# Patient Record
Sex: FEMALE | Race: WHITE | NOT HISPANIC OR LATINO | Employment: UNEMPLOYED | URBAN - METROPOLITAN AREA
[De-identification: names, ages, dates, MRNs, and addresses within clinical notes are randomized per-mention and may not be internally consistent; named-entity substitution may affect disease eponyms.]

---

## 2021-02-20 ENCOUNTER — HOSPITAL ENCOUNTER (OUTPATIENT)
Facility: HOSPITAL | Age: 61
Setting detail: OBSERVATION
Discharge: HOME | End: 2021-02-21
Attending: EMERGENCY MEDICINE | Admitting: INTERNAL MEDICINE

## 2021-02-20 ENCOUNTER — APPOINTMENT (OUTPATIENT)
Dept: CT IMAGING | Facility: HOSPITAL | Age: 61
End: 2021-02-20

## 2021-02-20 DIAGNOSIS — R11.0 NAUSEA: ICD-10-CM

## 2021-02-20 DIAGNOSIS — R10.9 INTRACTABLE ABDOMINAL PAIN: ICD-10-CM

## 2021-02-20 DIAGNOSIS — K85.90 ACUTE ON CHRONIC PANCREATITIS (HCC): Primary | ICD-10-CM

## 2021-02-20 DIAGNOSIS — K86.1 ACUTE ON CHRONIC PANCREATITIS (HCC): Primary | ICD-10-CM

## 2021-02-20 LAB
ALBUMIN SERPL-MCNC: 4 G/DL (ref 3.5–5.2)
ALBUMIN/GLOB SERPL: 1.3 G/DL
ALP SERPL-CCNC: 52 U/L (ref 39–117)
ALT SERPL W P-5'-P-CCNC: 13 U/L (ref 1–33)
AMYLASE SERPL-CCNC: 68 U/L (ref 28–100)
ANION GAP SERPL CALCULATED.3IONS-SCNC: 8.8 MMOL/L (ref 5–15)
AST SERPL-CCNC: 22 U/L (ref 1–32)
BASOPHILS # BLD AUTO: 0.08 10*3/MM3 (ref 0–0.2)
BASOPHILS NFR BLD AUTO: 1.3 % (ref 0–1.5)
BILIRUB SERPL-MCNC: 0.5 MG/DL (ref 0–1.2)
BILIRUB UR QL STRIP: NEGATIVE
BUN SERPL-MCNC: 10 MG/DL (ref 6–20)
BUN/CREAT SERPL: 12.2 (ref 7–25)
CALCIUM SPEC-SCNC: 9 MG/DL (ref 8.6–10.5)
CHLORIDE SERPL-SCNC: 93 MMOL/L (ref 98–107)
CLARITY UR: CLEAR
CO2 SERPL-SCNC: 31.2 MMOL/L (ref 22–29)
COLOR UR: YELLOW
CREAT SERPL-MCNC: 0.82 MG/DL (ref 0.57–1)
DEPRECATED RDW RBC AUTO: 40.8 FL (ref 37–54)
EOSINOPHIL # BLD AUTO: 0.08 10*3/MM3 (ref 0–0.4)
EOSINOPHIL NFR BLD AUTO: 1.3 % (ref 0.3–6.2)
ERYTHROCYTE [DISTWIDTH] IN BLOOD BY AUTOMATED COUNT: 11.8 % (ref 12.3–15.4)
GFR SERPL CREATININE-BSD FRML MDRD: 72 ML/MIN/1.73
GLOBULIN UR ELPH-MCNC: 3 GM/DL
GLUCOSE SERPL-MCNC: 89 MG/DL (ref 65–99)
GLUCOSE UR STRIP-MCNC: NEGATIVE MG/DL
HCT VFR BLD AUTO: 40.7 % (ref 34–46.6)
HGB BLD-MCNC: 13.5 G/DL (ref 12–15.9)
HGB UR QL STRIP.AUTO: NEGATIVE
IMM GRANULOCYTES # BLD AUTO: 0.01 10*3/MM3 (ref 0–0.05)
IMM GRANULOCYTES NFR BLD AUTO: 0.2 % (ref 0–0.5)
KETONES UR QL STRIP: NEGATIVE
LEUKOCYTE ESTERASE UR QL STRIP.AUTO: NEGATIVE
LIPASE SERPL-CCNC: 17 U/L (ref 13–60)
LYMPHOCYTES # BLD AUTO: 2.64 10*3/MM3 (ref 0.7–3.1)
LYMPHOCYTES NFR BLD AUTO: 41.8 % (ref 19.6–45.3)
MAGNESIUM SERPL-MCNC: 1.9 MG/DL (ref 1.6–2.6)
MCH RBC QN AUTO: 31 PG (ref 26.6–33)
MCHC RBC AUTO-ENTMCNC: 33.2 G/DL (ref 31.5–35.7)
MCV RBC AUTO: 93.6 FL (ref 79–97)
MONOCYTES # BLD AUTO: 0.65 10*3/MM3 (ref 0.1–0.9)
MONOCYTES NFR BLD AUTO: 10.3 % (ref 5–12)
NEUTROPHILS NFR BLD AUTO: 2.85 10*3/MM3 (ref 1.7–7)
NEUTROPHILS NFR BLD AUTO: 45.1 % (ref 42.7–76)
NITRITE UR QL STRIP: NEGATIVE
NRBC BLD AUTO-RTO: 0 /100 WBC (ref 0–0.2)
PH UR STRIP.AUTO: 7 [PH] (ref 5–8)
PLATELET # BLD AUTO: 248 10*3/MM3 (ref 140–450)
PMV BLD AUTO: 9.7 FL (ref 6–12)
POTASSIUM SERPL-SCNC: 3.6 MMOL/L (ref 3.5–5.2)
PROT SERPL-MCNC: 7 G/DL (ref 6–8.5)
PROT UR QL STRIP: NEGATIVE
RBC # BLD AUTO: 4.35 10*6/MM3 (ref 3.77–5.28)
SARS-COV-2 ORF1AB RESP QL NAA+PROBE: NOT DETECTED
SODIUM SERPL-SCNC: 133 MMOL/L (ref 136–145)
SP GR UR STRIP: <=1.005 (ref 1–1.03)
UROBILINOGEN UR QL STRIP: NORMAL
WBC # BLD AUTO: 6.31 10*3/MM3 (ref 3.4–10.8)

## 2021-02-20 PROCEDURE — 25810000003 SODIUM CHLORIDE 0.9 % WITH KCL 20 MEQ 20-0.9 MEQ/L-% SOLUTION: Performed by: INTERNAL MEDICINE

## 2021-02-20 PROCEDURE — 25010000002 HYDROMORPHONE 1 MG/ML SOLUTION: Performed by: EMERGENCY MEDICINE

## 2021-02-20 PROCEDURE — 85025 COMPLETE CBC W/AUTO DIFF WBC: CPT | Performed by: EMERGENCY MEDICINE

## 2021-02-20 PROCEDURE — 25010000002 METOCLOPRAMIDE PER 10 MG: Performed by: EMERGENCY MEDICINE

## 2021-02-20 PROCEDURE — C9803 HOPD COVID-19 SPEC COLLECT: HCPCS

## 2021-02-20 PROCEDURE — G0378 HOSPITAL OBSERVATION PER HR: HCPCS

## 2021-02-20 PROCEDURE — 83690 ASSAY OF LIPASE: CPT | Performed by: EMERGENCY MEDICINE

## 2021-02-20 PROCEDURE — 25010000002 HYDROMORPHONE PER 4 MG: Performed by: INTERNAL MEDICINE

## 2021-02-20 PROCEDURE — 83735 ASSAY OF MAGNESIUM: CPT | Performed by: EMERGENCY MEDICINE

## 2021-02-20 PROCEDURE — 96376 TX/PRO/DX INJ SAME DRUG ADON: CPT

## 2021-02-20 PROCEDURE — 96375 TX/PRO/DX INJ NEW DRUG ADDON: CPT

## 2021-02-20 PROCEDURE — 81003 URINALYSIS AUTO W/O SCOPE: CPT | Performed by: EMERGENCY MEDICINE

## 2021-02-20 PROCEDURE — 96374 THER/PROPH/DIAG INJ IV PUSH: CPT

## 2021-02-20 PROCEDURE — 99284 EMERGENCY DEPT VISIT MOD MDM: CPT

## 2021-02-20 PROCEDURE — 25010000002 ENOXAPARIN PER 10 MG: Performed by: INTERNAL MEDICINE

## 2021-02-20 PROCEDURE — 25010000002 IOPAMIDOL 61 % SOLUTION: Performed by: INTERNAL MEDICINE

## 2021-02-20 PROCEDURE — 96372 THER/PROPH/DIAG INJ SC/IM: CPT

## 2021-02-20 PROCEDURE — 80053 COMPREHEN METABOLIC PANEL: CPT | Performed by: EMERGENCY MEDICINE

## 2021-02-20 PROCEDURE — 82150 ASSAY OF AMYLASE: CPT | Performed by: EMERGENCY MEDICINE

## 2021-02-20 PROCEDURE — U0004 COV-19 TEST NON-CDC HGH THRU: HCPCS | Performed by: EMERGENCY MEDICINE

## 2021-02-20 PROCEDURE — 74177 CT ABD & PELVIS W/CONTRAST: CPT

## 2021-02-20 RX ORDER — LEVOTHYROXINE SODIUM 0.1 MG/1
100 TABLET ORAL
Status: DISCONTINUED | OUTPATIENT
Start: 2021-02-21 | End: 2021-02-21 | Stop reason: HOSPADM

## 2021-02-20 RX ORDER — ACETAMINOPHEN 160 MG/5ML
650 SOLUTION ORAL EVERY 4 HOURS PRN
Status: DISCONTINUED | OUTPATIENT
Start: 2021-02-20 | End: 2021-02-21 | Stop reason: HOSPADM

## 2021-02-20 RX ORDER — TOPIRAMATE 100 MG/1
100 TABLET, FILM COATED ORAL NIGHTLY
COMMUNITY

## 2021-02-20 RX ORDER — PROMETHAZINE HYDROCHLORIDE 25 MG/1
12.5-25 TABLET ORAL EVERY 6 HOURS PRN
COMMUNITY

## 2021-02-20 RX ORDER — ACETAMINOPHEN 325 MG/1
650 TABLET ORAL EVERY 4 HOURS PRN
Status: DISCONTINUED | OUTPATIENT
Start: 2021-02-20 | End: 2021-02-21 | Stop reason: HOSPADM

## 2021-02-20 RX ORDER — NALOXONE HCL 0.4 MG/ML
0.4 VIAL (ML) INJECTION
Status: DISCONTINUED | OUTPATIENT
Start: 2021-02-20 | End: 2021-02-21 | Stop reason: HOSPADM

## 2021-02-20 RX ORDER — SODIUM CHLORIDE 0.9 % (FLUSH) 0.9 %
10 SYRINGE (ML) INJECTION AS NEEDED
Status: DISCONTINUED | OUTPATIENT
Start: 2021-02-20 | End: 2021-02-21 | Stop reason: HOSPADM

## 2021-02-20 RX ORDER — OXYCODONE AND ACETAMINOPHEN 10; 325 MG/1; MG/1
1 TABLET ORAL EVERY 8 HOURS PRN
Status: DISCONTINUED | OUTPATIENT
Start: 2021-02-20 | End: 2021-02-21

## 2021-02-20 RX ORDER — ESTRADIOL 2 MG/1
2 TABLET ORAL DAILY
COMMUNITY

## 2021-02-20 RX ORDER — LAMOTRIGINE 50 MG/1
50 TABLET, EXTENDED RELEASE ORAL DAILY
COMMUNITY

## 2021-02-20 RX ORDER — PROMETHAZINE HYDROCHLORIDE 12.5 MG/1
12.5 SUPPOSITORY RECTAL EVERY 6 HOURS PRN
COMMUNITY

## 2021-02-20 RX ORDER — SUCRALFATE 1 G/1
1 TABLET ORAL EVERY 4 HOURS PRN
COMMUNITY

## 2021-02-20 RX ORDER — ACETAMINOPHEN 650 MG/1
650 SUPPOSITORY RECTAL EVERY 4 HOURS PRN
Status: DISCONTINUED | OUTPATIENT
Start: 2021-02-20 | End: 2021-02-21 | Stop reason: HOSPADM

## 2021-02-20 RX ORDER — ONDANSETRON 2 MG/ML
4 INJECTION INTRAMUSCULAR; INTRAVENOUS EVERY 6 HOURS PRN
Status: DISCONTINUED | OUTPATIENT
Start: 2021-02-20 | End: 2021-02-21 | Stop reason: HOSPADM

## 2021-02-20 RX ORDER — SODIUM CHLORIDE 0.9 % (FLUSH) 0.9 %
10 SYRINGE (ML) INJECTION EVERY 12 HOURS SCHEDULED
Status: DISCONTINUED | OUTPATIENT
Start: 2021-02-20 | End: 2021-02-21 | Stop reason: HOSPADM

## 2021-02-20 RX ORDER — OXYCODONE HYDROCHLORIDE 5 MG/1
5 CAPSULE ORAL 4 TIMES DAILY PRN
COMMUNITY

## 2021-02-20 RX ORDER — HYDROMORPHONE HYDROCHLORIDE 1 MG/ML
0.5 INJECTION, SOLUTION INTRAMUSCULAR; INTRAVENOUS; SUBCUTANEOUS
Status: DISCONTINUED | OUTPATIENT
Start: 2021-02-20 | End: 2021-02-21

## 2021-02-20 RX ORDER — SODIUM CHLORIDE AND POTASSIUM CHLORIDE 150; 900 MG/100ML; MG/100ML
100 INJECTION, SOLUTION INTRAVENOUS CONTINUOUS
Status: DISCONTINUED | OUTPATIENT
Start: 2021-02-20 | End: 2021-02-21 | Stop reason: HOSPADM

## 2021-02-20 RX ORDER — METOCLOPRAMIDE HYDROCHLORIDE 5 MG/ML
10 INJECTION INTRAMUSCULAR; INTRAVENOUS ONCE
Status: COMPLETED | OUTPATIENT
Start: 2021-02-20 | End: 2021-02-20

## 2021-02-20 RX ADMIN — SODIUM CHLORIDE, PRESERVATIVE FREE 10 ML: 5 INJECTION INTRAVENOUS at 21:14

## 2021-02-20 RX ADMIN — HYDROMORPHONE HYDROCHLORIDE 1 MG: 10 INJECTION, SOLUTION INTRAMUSCULAR; INTRAVENOUS; SUBCUTANEOUS at 16:41

## 2021-02-20 RX ADMIN — POTASSIUM CHLORIDE AND SODIUM CHLORIDE 100 ML/HR: 900; 150 INJECTION, SOLUTION INTRAVENOUS at 22:32

## 2021-02-20 RX ADMIN — METOCLOPRAMIDE HYDROCHLORIDE 10 MG: 5 INJECTION INTRAMUSCULAR; INTRAVENOUS at 16:40

## 2021-02-20 RX ADMIN — SODIUM CHLORIDE, POTASSIUM CHLORIDE, SODIUM LACTATE AND CALCIUM CHLORIDE 1000 ML: 600; 310; 30; 20 INJECTION, SOLUTION INTRAVENOUS at 16:41

## 2021-02-20 RX ADMIN — HYDROMORPHONE HYDROCHLORIDE 1 MG: 1 INJECTION, SOLUTION INTRAMUSCULAR; INTRAVENOUS; SUBCUTANEOUS at 18:06

## 2021-02-20 RX ADMIN — HYDROMORPHONE HYDROCHLORIDE 0.5 MG: 1 INJECTION, SOLUTION INTRAMUSCULAR; INTRAVENOUS; SUBCUTANEOUS at 21:39

## 2021-02-20 RX ADMIN — HYDROMORPHONE HYDROCHLORIDE 0.5 MG: 1 INJECTION, SOLUTION INTRAMUSCULAR; INTRAVENOUS; SUBCUTANEOUS at 23:43

## 2021-02-20 RX ADMIN — SODIUM CHLORIDE, POTASSIUM CHLORIDE, SODIUM LACTATE AND CALCIUM CHLORIDE 1000 ML: 600; 310; 30; 20 INJECTION, SOLUTION INTRAVENOUS at 18:05

## 2021-02-20 RX ADMIN — IOPAMIDOL 85 ML: 612 INJECTION, SOLUTION INTRAVENOUS at 19:48

## 2021-02-20 RX ADMIN — ENOXAPARIN SODIUM 40 MG: 100 INJECTION SUBCUTANEOUS at 21:13

## 2021-02-20 NOTE — H&P
Internal medicine history and physical  INTERNAL MEDICINE   [facility]       Patient Identification:  Name: Fartun Kahn  Age: 60 y.o.  Sex: female  :  1960  MRN: [id number]                   Primary Care Physician: Johnnie uMñiz MD                               Date of admission:2021    Chief Complaint: Nausea vomiting and abdominal pain since last night.    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  Patient follow-up with pain management as well as with Dr. lee for chronic pancreatitis issues.  Patient has some issues with her pain management and her medications recently got messed up consistent with: Primary care physician yesterday with no significant resolution of her pain medication issues.  Her last flare was in October of last year.  In that background last night she developed abdominal discomfort with associated nausea resulting in her attempt to call her primary care physician with no avail.  Because of persistent symptoms despite taking pain medication this morning without any relief and antinausea medication she decided to come to the emergency room for further evaluation.  Patient initially attempted to go to [facility] but because of this she was taken she decided to come here instead to our facility.  Work-up in the emergency room did not reveal acute metabolic abnormality and she has preserved renal function.  Lipase within normal limits.  Patient being admitted for pain control as well as management of her nausea.      Past Medical History:  Past Medical History:   Diagnosis Date   • Bipolar disorder (CMS/HCC)    • Constipation due to opioid therapy    • Hypothyroid    • Hypothyroid      Past Surgical  "History:  Past Surgical History:   Procedure Laterality Date   • HYSTERECTOMY     • PANCREATIC RESECTION      partial   • WHIPPLE PROCEDURE        Home Meds:  (Not in a hospital admission)    Current Meds:     Current Facility-Administered Medications:   •  lactated ringers bolus 1,000 mL, 1,000 mL, Intravenous, Once, Luis A Reed MD, Last Rate: 2,000 mL/hr at 02/20/21 1805, 1,000 mL at 02/20/21 1805    Current Outpatient Medications:   •  Cholecalciferol (VITAMIN D3) 5000 UNITS capsule capsule, Take 2,000 Units by mouth Daily., Disp: , Rfl:   •  levothyroxine (SYNTHROID, LEVOTHROID) 100 MCG tablet, Take 100 mcg by mouth Daily., Disp: , Rfl:   •  oxyCODONE-acetaminophen (PERCOCET)  MG per tablet, Take 1 tablet by mouth Every 8 (Eight) Hours As Needed for moderate pain (4-6)., Disp: , Rfl:   •  triamterene-hydrochlorothiazide (DYAZIDE) 37.5-25 MG per capsule, Take 1 capsule by mouth Every Morning., Disp: , Rfl:   Allergies:  Allergies   Allergen Reactions   • Sulfa Antibiotics Nausea Only   • Sumatriptan Nausea And Vomiting     Caused reaction to pancreas; hx of chronic pancreatitis     Social History:   Social History     Tobacco Use   • Smoking status: Not on file   Substance Use Topics   • Alcohol use: Not on file      Family History:  History reviewed. No pertinent family history.       Review of Systems  See history of present illness and past medical history.   Constitutional: Remarkable for no fever or chills  Cardiovascular: Remarkable for no chest pain, shortness of breath  GI: Remarkable for nausea abdominal pain but no vomiting or diarrhea  : Remarkable for no burning in urination frequency or urgency  Musculoskeletal: No specific joint aches and pain  Neurological: Remarkable for no loss of consciousness or continence.    Remainder of ROS is negative.      Vitals:   BP 96/67   Pulse 73   Temp 97.6 °F (36.4 °C) (Tympanic)   Resp 18   Ht 170.2 cm (67\")   Wt 56.7 kg (125 lb)   SpO2 99%   " BMI 19.58 kg/m²   I/O:     Intake/Output Summary (Last 24 hours) at 2/20/2021 1823  Last data filed at 2/20/2021 1805  Gross per 24 hour   Intake 2000 ml   Output --   Net 2000 ml     Exam:  Patient is examined using the personal protective equipment as per guidelines from infection control for this particular patient as enacted.  Hand washing was performed before and after patient interaction.  General: Awake, alert, no acute distress, nontoxic, nondiaphoretic  HEENT: Mucous membranes moist, atraumatic, EOMI  Neck: Full ROM  Pulm: Symmetric chest rise, nonlabored, lungs CTAB  Cardiovascular: Regular rate and rhythm, intact distal pulses  GI: Soft, mild to moderate generalized tenderness to palpation, nondistended, no rebound, no guarding, bowel sounds present  MSK: Full ROM, no deformity  Skin: Warm, dry  Neuro: Alert and oriented x 3, GCS 15, moving all extremities, no focal deficits  Psych: Calm, cooperative    Data Review:      I reviewed the patient's new clinical results.  Results from last 7 days   Lab Units 02/20/21  1639   WBC 10*3/mm3 6.31   HEMOGLOBIN g/dL 13.5   PLATELETS 10*3/mm3 248     Results from last 7 days   Lab Units 02/20/21  1639   SODIUM mmol/L 133*   POTASSIUM mmol/L 3.6   CHLORIDE mmol/L 93*   CO2 mmol/L 31.2*   BUN mg/dL 10   CREATININE mg/dL 0.82   CALCIUM mg/dL 9.0   GLUCOSE mg/dL 89     Microbiology Results (last 10 days)     ** No results found for the last 240 hours. **        No radiology results for the last 30 days.  No orders to display       Assessment:  Active Hospital Problems    Diagnosis POA   • **Acute on chronic pancreatitis (CMS/HCC) [K85.90, K86.1] Yes   • Bipolar disorder (CMS/HCC) [F31.9] Unknown   • Constipation due to opioid therapy [K59.03, T40.2X5A] Unknown   • Hypothyroid [E03.9] Unknown       Medical decision making:  Acute on chronic recurrent pancreatitis with abdominal pain nausea-plan is to admit the patient provided with IV fluids along with clear liquids  advance diet as tolerated and continue with pain medications and antiemetics.  History of recurrent pancreatitis status post Whipple's procedure continue supportive care and home regimen for pain.  Hypothyroidism-continue thyroid replacement therapy.    Dehydration-continue with IV fluids.  Hypertension patient is on hydrochlorothiazide and spironolactone showing low sodium level and contraction alkalosis plan is to hold her diuretic while she is getting IV fluids.  Darlene Mcdonough MD   2/20/2021  18:23 EST  Much of this encounter note is an electronic transcription/translation of spoken language to printed text. The electronic translation of spoken language may permit erroneous, or at times, nonsensical words or phrases to be inadvertently transcribed; Although I have reviewed the note for such errors, some may still exist

## 2021-02-20 NOTE — ED TRIAGE NOTES
Pt reports abd. Pain started last night. Pt reports nausea. Pt has hx of pancreatitis. Pt took 30 mg phenergan and 20mg oxycodone PTA with no relief. Patient masked in first look triage. I was wearing mask and goggles.

## 2021-02-20 NOTE — ED PROVIDER NOTES
EMERGENCY DEPARTMENT ENCOUNTER    Room Number:  P681/1  Date of encounter:  2/20/2021  PCP: Johnnie Muñiz MD  Historian: Patient      HPI:  Chief Complaint: Abdominal pain, nausea  A complete HPI/ROS/PMH/PSH/SH/FH are unobtainable due to: None    Context: Fartun Kahn is a 60 y.o. female who presents to the ED via private vehicle for evaluation for worsening acute on chronic abdominal pain that started last night.  Reports significant nausea with this.  Reports a history of chronic pancreatitis related to a congenital pancreatic abnormality, status post Whipple in 2012.  Patient states home oxycodone and Phenergan not helping with pain or nausea.  Has not had any significant vomiting.  Describes some shortness of breath due to pain but denies any fevers, chills, cough, dysuria.  States that she took oxycodone at around 10 AM this morning without relief and Phenergan at 10 AM and again shortly before arrival without any improvement in nausea. Patient reported to triage nurse that she initially went to [place], but felt ignored and decided to come here instead.  She typically follows up with Dr. Muñiz and Dr. Correia as well as Pain Management for her chronic pancreatitis issues.  She states that her last flareup was several months ago      MEDICAL RECORD REVIEW    Telephone encounter noted with primary care provider's office stating that she had called on February 19 for a pancreatitis flare, states that her pain management office had a mishap with refill for pain medications and could not get it filled.  Primary care provider's office unable to fill it over the phone.    PAST MEDICAL HISTORY  Active Ambulatory Problems     Diagnosis Date Noted   • No Active Ambulatory Problems     Resolved Ambulatory Problems     Diagnosis Date Noted   • No Resolved Ambulatory Problems     Past Medical History:   Diagnosis Date   • Asthma    • Bipolar disorder (CMS/HCC)    • Constipation due to opioid therapy    • Elevated  cholesterol    • History of transfusion    • Hypothyroid    • Hypothyroid          PAST SURGICAL HISTORY  Past Surgical History:   Procedure Laterality Date   • ABDOMINAL SURGERY     • APPENDECTOMY     • COLONOSCOPY     • ENDOSCOPY     • FRACTURE SURGERY     • HYSTERECTOMY     • PANCREATIC RESECTION      partial   • TONSILLECTOMY     • WHIPPLE PROCEDURE           FAMILY HISTORY  History reviewed. No pertinent family history.      SOCIAL HISTORY  Social History     Socioeconomic History   • Marital status:      Spouse name: Not on file   • Number of children: Not on file   • Years of education: Not on file   • Highest education level: Not on file   Tobacco Use   • Smoking status: Never Smoker   Substance and Sexual Activity   • Alcohol use: Never     Frequency: Never   • Drug use: Never   • Sexual activity: Not Currently         ALLERGIES  Sulfa antibiotics and Sumatriptan        REVIEW OF SYSTEMS  Review of Systems     All systems reviewed and negative except for those discussed in HPI.       PHYSICAL EXAM    I have reviewed the triage vital signs and nursing notes.    ED Triage Vitals   Temp Heart Rate Resp BP SpO2   02/20/21 1543 02/20/21 1543 02/20/21 1543 02/20/21 1600 02/20/21 1543   97.6 °F (36.4 °C) 77 18 96/67 96 %      Temp src Heart Rate Source Patient Position BP Location FiO2 (%)   02/20/21 1543 -- -- -- --   Tympanic           Physical Exam  General: Awake, alert, no acute distress, nontoxic, nondiaphoretic  HEENT: Mucous membranes moist, atraumatic, EOMI  Neck: Full ROM  Pulm: Symmetric chest rise, nonlabored, lungs CTAB  Cardiovascular: Regular rate and rhythm, intact distal pulses  GI: Soft, mild to moderate generalized tenderness to palpation, nondistended, no rebound, no guarding, bowel sounds present  MSK: Full ROM, no deformity  Skin: Warm, dry  Neuro: Alert and oriented x 3, GCS 15, moving all extremities, no focal deficits  Psych: Calm, cooperative      I wore an N95 mask and gloves used  during this encounter. Patient in surgical mask.      LAB RESULTS  Recent Results (from the past 24 hour(s))   Urinalysis With Microscopic If Indicated (No Culture) - Urine, Clean Catch    Collection Time: 02/20/21  4:36 PM    Specimen: Urine, Clean Catch   Result Value Ref Range    Color, UA Yellow Yellow, Straw    Appearance, UA Clear Clear    pH, UA 7.0 5.0 - 8.0    Specific Gravity, UA <=1.005 1.005 - 1.030    Glucose, UA Negative Negative    Ketones, UA Negative Negative    Bilirubin, UA Negative Negative    Blood, UA Negative Negative    Protein, UA Negative Negative    Leuk Esterase, UA Negative Negative    Nitrite, UA Negative Negative    Urobilinogen, UA 0.2 E.U./dL 0.2 - 1.0 E.U./dL   Comprehensive Metabolic Panel    Collection Time: 02/20/21  4:39 PM    Specimen: Blood   Result Value Ref Range    Glucose 89 65 - 99 mg/dL    BUN 10 6 - 20 mg/dL    Creatinine 0.82 0.57 - 1.00 mg/dL    Sodium 133 (L) 136 - 145 mmol/L    Potassium 3.6 3.5 - 5.2 mmol/L    Chloride 93 (L) 98 - 107 mmol/L    CO2 31.2 (H) 22.0 - 29.0 mmol/L    Calcium 9.0 8.6 - 10.5 mg/dL    Total Protein 7.0 6.0 - 8.5 g/dL    Albumin 4.00 3.50 - 5.20 g/dL    ALT (SGPT) 13 1 - 33 U/L    AST (SGOT) 22 1 - 32 U/L    Alkaline Phosphatase 52 39 - 117 U/L    Total Bilirubin 0.5 0.0 - 1.2 mg/dL    eGFR Non African Amer 72 >60 mL/min/1.73    Globulin 3.0 gm/dL    A/G Ratio 1.3 g/dL    BUN/Creatinine Ratio 12.2 7.0 - 25.0    Anion Gap 8.8 5.0 - 15.0 mmol/L   Lipase    Collection Time: 02/20/21  4:39 PM    Specimen: Blood   Result Value Ref Range    Lipase 17 13 - 60 U/L   Magnesium    Collection Time: 02/20/21  4:39 PM    Specimen: Blood   Result Value Ref Range    Magnesium 1.9 1.6 - 2.6 mg/dL   CBC Auto Differential    Collection Time: 02/20/21  4:39 PM    Specimen: Blood   Result Value Ref Range    WBC 6.31 3.40 - 10.80 10*3/mm3    RBC 4.35 3.77 - 5.28 10*6/mm3    Hemoglobin 13.5 12.0 - 15.9 g/dL    Hematocrit 40.7 34.0 - 46.6 %    MCV 93.6 79.0 -  97.0 fL    MCH 31.0 26.6 - 33.0 pg    MCHC 33.2 31.5 - 35.7 g/dL    RDW 11.8 (L) 12.3 - 15.4 %    RDW-SD 40.8 37.0 - 54.0 fl    MPV 9.7 6.0 - 12.0 fL    Platelets 248 140 - 450 10*3/mm3    Neutrophil % 45.1 42.7 - 76.0 %    Lymphocyte % 41.8 19.6 - 45.3 %    Monocyte % 10.3 5.0 - 12.0 %    Eosinophil % 1.3 0.3 - 6.2 %    Basophil % 1.3 0.0 - 1.5 %    Immature Grans % 0.2 0.0 - 0.5 %    Neutrophils, Absolute 2.85 1.70 - 7.00 10*3/mm3    Lymphocytes, Absolute 2.64 0.70 - 3.10 10*3/mm3    Monocytes, Absolute 0.65 0.10 - 0.90 10*3/mm3    Eosinophils, Absolute 0.08 0.00 - 0.40 10*3/mm3    Basophils, Absolute 0.08 0.00 - 0.20 10*3/mm3    Immature Grans, Absolute 0.01 0.00 - 0.05 10*3/mm3    nRBC 0.0 0.0 - 0.2 /100 WBC   Amylase    Collection Time: 02/20/21  4:39 PM    Specimen: Blood   Result Value Ref Range    Amylase 68 28 - 100 U/L       Ordered the above labs and independently reviewed the results.        RADIOLOGY  Ct Abdomen Pelvis With Contrast    Result Date: 2/20/2021  CT OF THE ABDOMEN AND PELVIS WITH CONTRAST  HISTORY: Abdominal pain and nausea. HISTORY of pancreatitis. Patient is also undergone Whipple procedure.  COMPARISON: None available.  TECHNIQUE: Axial CT imaging was obtained through the abdomen and pelvis. IV contrast was administered.  FINDINGS: Images through the lung bases straight some mild dependent atelectasis. No focal hepatic lesions are seen. There is no biliary dilatation. Gallbladder is absent. Spleen and adrenal glands are normal. There are changes of prior Whipple procedure. There is no evidence of obstruction. The pancreatic body and tail appear unremarkable. There is a retroaortic left renal vein. The kidneys enhance symmetrically. There is no hydronephrosis. There is no evidence of bowel obstruction. Patient does have mesenteric edema, uncertain clinical significance. Urinary bladder is markedly distended. Correlation with any symptoms of urinary retention is recommended. Uterus is  surgically absent. There is a small amount of free fluid which is noted within the pelvis. There is some increased stool burden noted throughout the colon, which may represent some constipation. The appendix cannot be visualized. No acute osseous abnormalities are seen.       1. Changes of prior Whipple procedure. No convincing evidence of pancreatitis on these images. 2. Patient does appear to have some mesenteric edema, uncertain clinical significance. 3. Distention of the urinary bladder may reflect urinary retention. 4. Increased stool burden, suggesting constipation.  Radiation dose reduction techniques were utilized, including automated exposure control and exposure modulation based on body size.  This report was finalized on 2/20/2021 8:17 PM by Dr. Amy Ruth M.D.        I ordered the above noted radiological studies. Reviewed by me.  See dictation for official radiology interpretation.      PROCEDURES    Procedures      MEDICATIONS GIVEN IN ER    Medications   levothyroxine (SYNTHROID, LEVOTHROID) tablet 100 mcg (has no administration in time range)   oxyCODONE-acetaminophen (PERCOCET)  MG per tablet 1 tablet ( Oral Return to Cabinet 2/20/21 2223)   sodium chloride 0.9 % flush 10 mL (10 mL Intravenous Given 2/20/21 2114)   sodium chloride 0.9 % flush 10 mL (has no administration in time range)   enoxaparin (LOVENOX) syringe 40 mg (40 mg Subcutaneous Given 2/20/21 2113)   sodium chloride 0.9 % with KCl 20 mEq/L infusion (100 mL/hr Intravenous New Bag 2/20/21 2232)   ondansetron (ZOFRAN) injection 4 mg (has no administration in time range)   acetaminophen (TYLENOL) tablet 650 mg (has no administration in time range)     Or   acetaminophen (TYLENOL) 160 MG/5ML solution 650 mg (has no administration in time range)     Or   acetaminophen (TYLENOL) suppository 650 mg (has no administration in time range)   HYDROmorphone (DILAUDID) injection 0.5 mg (0.5 mg Intravenous Given 2/20/21 2139)     And    naloxone (NARCAN) injection 0.4 mg (has no administration in time range)   lactated ringers bolus 1,000 mL (1,000 mL Intravenous New Bag 2/20/21 1641)   metoclopramide (REGLAN) injection 10 mg (10 mg Intravenous Given 2/20/21 1640)   HYDROmorphone (DILAUDID) injection 1 mg (1 mg Intravenous Given 2/20/21 1641)   HYDROmorphone (DILAUDID) injection 1 mg (1 mg Intravenous Given 2/20/21 1806)   lactated ringers bolus 1,000 mL (0 mL Intravenous Stopped 2/20/21 1851)   iopamidol (ISOVUE-300) 61 % injection 100 mL (85 mL Intravenous Given by Other 2/20/21 1948)         PROGRESS, DATA ANALYSIS, CONSULTS, AND MEDICAL DECISION MAKING    All labs have been independently reviewed by me.  All radiology studies have been reviewed by me and discussed with radiologist dictating the report.   EKG's independently viewed and interpreted by me.  Discussion below represents my analysis of pertinent findings related to patient's condition, differential diagnosis, treatment plan and final disposition.    Likely acute on chronic pancreatitis but will evaluate for any evidence of renal failure, electrolyte abnormalities, as well among others.    Patient arrived appearing quite uncomfortable from the pain.  Vital signs appear stable and when discussing discharge versus observation the patient became quite emotional and adamant that her pain was quite severe and had not been appropriately addressed, and that she did not feel comfortable going home in her current state of pain.  CT scan was added on which showed no significant evidence of acute pancreatitis but did have mesenteric edema which may be driving some of the pain as well as some constipation.  However, due to the patient's reports of significant severe pain still, will plan for hospitalization for further pain control and monitoring.    ED Course as of Feb 20 2234   Sat Feb 20, 2021 1712 WBC: 6.31 [DC]   1712 Hemoglobin: 13.5 [DC]   1723 Lipase: 17 [DC]   1723 Ketones, UA:  Negative [DC]   1757 Patient reevaluated, updated her on the reassuring lab findings, she reports being in severe pain still with only minimal relief with the IV pain medications.  Nausea has improved.  She does not feel comfortable being discharged, states that her pain is way too severe at this time, needs further pain control.    [DC]   1821 Discussed with Dr. Mcdonough [facility], discussed patient's clinical course and findings today, treatment modalities, and need for hospitalization.    [DC]      ED Course User Index  [DC] Luis A Reed MD       AS OF 22:34 EST VITALS:    BP - 106/67  HR - 57  TEMP - 96.7 °F (35.9 °C) (Oral)  02 SATS - 98%        DIAGNOSIS  Final diagnoses:   Acute on chronic pancreatitis (CMS/HCC)   Intractable abdominal pain   Nausea         DISPOSITION  HOSPITALIZATION    Discussed treatment plan and reason for hospitalization with pt/family and hospitalizing physician.  Pt/family voiced understanding of the plan for hospitalization for further testing/treatment as needed.                  Luis A Reed MD  02/20/21 5058

## 2021-02-21 VITALS
HEART RATE: 67 BPM | WEIGHT: 122.5 LBS | OXYGEN SATURATION: 99 % | HEIGHT: 67 IN | TEMPERATURE: 97.5 F | BODY MASS INDEX: 19.23 KG/M2 | RESPIRATION RATE: 18 BRPM | SYSTOLIC BLOOD PRESSURE: 96 MMHG | DIASTOLIC BLOOD PRESSURE: 62 MMHG

## 2021-02-21 PROBLEM — R10.812 LEFT UPPER QUADRANT ABDOMINAL TENDERNESS: Status: ACTIVE | Noted: 2021-02-20

## 2021-02-21 PROBLEM — I95.2 HYPOTENSION DUE TO DRUGS: Status: ACTIVE | Noted: 2021-02-21

## 2021-02-21 LAB
ALBUMIN SERPL-MCNC: 3 G/DL (ref 3.5–5.2)
ALBUMIN/GLOB SERPL: 1.3 G/DL
ALP SERPL-CCNC: 41 U/L (ref 39–117)
ALT SERPL W P-5'-P-CCNC: 10 U/L (ref 1–33)
ANION GAP SERPL CALCULATED.3IONS-SCNC: 8.7 MMOL/L (ref 5–15)
AST SERPL-CCNC: 17 U/L (ref 1–32)
BASOPHILS # BLD AUTO: 0.06 10*3/MM3 (ref 0–0.2)
BASOPHILS NFR BLD AUTO: 1.4 % (ref 0–1.5)
BILIRUB SERPL-MCNC: 0.4 MG/DL (ref 0–1.2)
BUN SERPL-MCNC: 9 MG/DL (ref 6–20)
BUN/CREAT SERPL: 14.3 (ref 7–25)
CALCIUM SPEC-SCNC: 8.1 MG/DL (ref 8.6–10.5)
CHLORIDE SERPL-SCNC: 104 MMOL/L (ref 98–107)
CO2 SERPL-SCNC: 25.3 MMOL/L (ref 22–29)
CREAT SERPL-MCNC: 0.63 MG/DL (ref 0.57–1)
DEPRECATED RDW RBC AUTO: 40.6 FL (ref 37–54)
EOSINOPHIL # BLD AUTO: 0.08 10*3/MM3 (ref 0–0.4)
EOSINOPHIL NFR BLD AUTO: 1.8 % (ref 0.3–6.2)
ERYTHROCYTE [DISTWIDTH] IN BLOOD BY AUTOMATED COUNT: 11.7 % (ref 12.3–15.4)
GFR SERPL CREATININE-BSD FRML MDRD: 97 ML/MIN/1.73
GLOBULIN UR ELPH-MCNC: 2.4 GM/DL
GLUCOSE SERPL-MCNC: 85 MG/DL (ref 65–99)
HCT VFR BLD AUTO: 34.3 % (ref 34–46.6)
HGB BLD-MCNC: 11.6 G/DL (ref 12–15.9)
IMM GRANULOCYTES # BLD AUTO: 0 10*3/MM3 (ref 0–0.05)
IMM GRANULOCYTES NFR BLD AUTO: 0 % (ref 0–0.5)
LYMPHOCYTES # BLD AUTO: 2.35 10*3/MM3 (ref 0.7–3.1)
LYMPHOCYTES NFR BLD AUTO: 54 % (ref 19.6–45.3)
MCH RBC QN AUTO: 32.3 PG (ref 26.6–33)
MCHC RBC AUTO-ENTMCNC: 33.8 G/DL (ref 31.5–35.7)
MCV RBC AUTO: 95.5 FL (ref 79–97)
MONOCYTES # BLD AUTO: 0.38 10*3/MM3 (ref 0.1–0.9)
MONOCYTES NFR BLD AUTO: 8.7 % (ref 5–12)
NEUTROPHILS NFR BLD AUTO: 1.48 10*3/MM3 (ref 1.7–7)
NEUTROPHILS NFR BLD AUTO: 34.1 % (ref 42.7–76)
NRBC BLD AUTO-RTO: 0 /100 WBC (ref 0–0.2)
PLATELET # BLD AUTO: 186 10*3/MM3 (ref 140–450)
PMV BLD AUTO: 10 FL (ref 6–12)
POTASSIUM SERPL-SCNC: 3.5 MMOL/L (ref 3.5–5.2)
PROT SERPL-MCNC: 5.4 G/DL (ref 6–8.5)
RBC # BLD AUTO: 3.59 10*6/MM3 (ref 3.77–5.28)
SODIUM SERPL-SCNC: 138 MMOL/L (ref 136–145)
WBC # BLD AUTO: 4.35 10*3/MM3 (ref 3.4–10.8)

## 2021-02-21 PROCEDURE — 80053 COMPREHEN METABOLIC PANEL: CPT | Performed by: INTERNAL MEDICINE

## 2021-02-21 PROCEDURE — G0378 HOSPITAL OBSERVATION PER HR: HCPCS

## 2021-02-21 PROCEDURE — 85025 COMPLETE CBC W/AUTO DIFF WBC: CPT | Performed by: INTERNAL MEDICINE

## 2021-02-21 RX ORDER — AMOXICILLIN 250 MG
1 CAPSULE ORAL 2 TIMES DAILY
Status: DISCONTINUED | OUTPATIENT
Start: 2021-02-21 | End: 2021-02-21 | Stop reason: HOSPADM

## 2021-02-21 RX ORDER — POLYETHYLENE GLYCOL 3350 17 G/17G
17 POWDER, FOR SOLUTION ORAL DAILY
Qty: 10 EACH | Refills: 0 | Status: SHIPPED | OUTPATIENT
Start: 2021-02-21

## 2021-02-21 RX ORDER — LIDOCAINE 50 MG/G
1 PATCH TOPICAL
Status: DISCONTINUED | OUTPATIENT
Start: 2021-02-21 | End: 2021-02-21 | Stop reason: HOSPADM

## 2021-02-21 RX ORDER — AMOXICILLIN 250 MG
1 CAPSULE ORAL 2 TIMES DAILY PRN
Qty: 20 TABLET | Refills: 0 | Status: SHIPPED | OUTPATIENT
Start: 2021-02-21 | End: 2021-03-03

## 2021-02-21 RX ORDER — OXYCODONE HYDROCHLORIDE AND ACETAMINOPHEN 5; 325 MG/1; MG/1
1 TABLET ORAL EVERY 8 HOURS PRN
Status: DISCONTINUED | OUTPATIENT
Start: 2021-02-21 | End: 2021-02-21 | Stop reason: HOSPADM

## 2021-02-21 RX ADMIN — LEVOTHYROXINE SODIUM 100 MCG: 0.1 TABLET ORAL at 05:20

## 2021-02-21 RX ADMIN — LIDOCAINE 1 PATCH: 50 PATCH TOPICAL at 13:05

## 2021-02-21 RX ADMIN — OXYCODONE HYDROCHLORIDE AND ACETAMINOPHEN 1 TABLET: 5; 325 TABLET ORAL at 10:03

## 2021-02-21 RX ADMIN — DOCUSATE SODIUM 50MG AND SENNOSIDES 8.6MG 1 TABLET: 8.6; 5 TABLET, FILM COATED ORAL at 09:41

## 2021-02-21 RX ADMIN — SODIUM CHLORIDE 500 ML: 9 INJECTION, SOLUTION INTRAVENOUS at 06:08

## 2021-02-21 RX ADMIN — SODIUM CHLORIDE, POTASSIUM CHLORIDE, SODIUM LACTATE AND CALCIUM CHLORIDE 500 ML: 600; 310; 30; 20 INJECTION, SOLUTION INTRAVENOUS at 08:56

## 2021-02-21 NOTE — DISCHARGE SUMMARY
"    Patient Name: Fartun Kahn  : 1960  MRN: [id number]    Date of Admission: 2021  Date of Discharge:  2021  Primary Care Physician: Johnnie Muñiz MD      Chief Complaint:   Abdominal Pain      Discharge Diagnoses     Active Hospital Problems    Diagnosis  POA   • **Left upper quadrant abdominal tenderness [R10.812]  Yes   • Hypotension due to drugs [I95.2]  Yes   • Bipolar disorder (CMS/HCC) [F31.9]  Unknown   • Constipation due to opioid therapy [K59.03, T40.2X5A]  Unknown   • Hypothyroid [E03.9]  Unknown      Resolved Hospital Problems   No resolved problems to display.        Hospital Course     Ms. Kahn is a 60 y.o. female with a history of hypothyroidism, bipolar disorder, hypercholesterolemia, partial pancreatic resection / Whipple procedure  who presented to [facility] initially complaining of abdominal pain.  Please see the admitting history and physical for further details.  She was found to have left upper quadrant abdominal tenderness and was admitted to the hospital for further evaluation and treatment.      Patient reports chronic left upper quadrant abdominal tenderness for which patient reportedly follows with Dr. Correia who discussed possible pain management in outpatient setting approximately \"several months ago\".  Patient has not followed up with pain management and noted acute on chronic left upper quadrant abdominal pain beginning over the \"last several days\"; therefore, initially proceeded to [facility] ER department; however, unimpressed with ER  service; therefore, went to [facility] ER for further evaluation.    Noted patient afebrile on admission without evidence of leukocytosis.  Unremarkable serum lipase 17 and amylase 68.  CT abdomen pelvis with contrast report findings no convincing evidence of pancreatitis on imaging with some mesenteric edema of uncertain clinical significance noted.  Distention of urinary bladder and " increased stool burden suggesting constipation.      Patient denies urinary complaints and noted urinalysis unremarkable for infection.  Patient reports last bowel movement on 2/17/2021 possibly contributing to nausea symptoms versus possible presence of gastroparesis; however, patient noted adherence to bowel regimen at home prior to admission yet unable to identify stool softeners or laxatives on home medication list.  Plan plan includes MiraLAX daily and Elba-Colace twice daily with hold parameters for presence of diarrhea.  Consider Relistor for opioid-induced constipation.    Incidental finding hypotension despite absence of antihypertensive or infectious process given negative leukocytosis / afebrile state / unremarkable neutrophilia percent.  Low blood pressure most likely secondary to opioid medication; therefore, patient received approximately 2 L IV fluid bolus and cessation of IV opioid analgesic with normalization of BP prior to discharge.    Day of Discharge     Subjective:  Patient is relatively comfortable and in no apparent distress.  Tolerating full liquid diet.  Voices no new concerns.  Agrees to discharge on 2/21/2021 and follow-up with Dr. Correia for pancreatic referred pain.    Review of Systems   Gastrointestinal: Positive for abdominal pain (Tolerable left upper quadrant abdominal tenderness radiating to back).   All other systems reviewed and are negative.      Physical Exam:  Temp:  [96.7 °F (35.9 °C)-98 °F (36.7 °C)] 97.5 °F (36.4 °C)  Heart Rate:  [50-77] 67  Resp:  [18] 18  BP: ()/(44-68) 96/62  Body mass index is 19.07 kg/m².     Physical Exam  Constitutional:       General: She is not in acute distress.     Appearance: She is not ill-appearing, toxic-appearing or diaphoretic.   HENT:      Head: Normocephalic and atraumatic.   Eyes:      General: No scleral icterus.     Extraocular Movements: Extraocular movements intact.      Conjunctiva/sclera: Conjunctivae normal.   Neck:       Musculoskeletal: Normal range of motion and neck supple.   Cardiovascular:      Rate and Rhythm: Normal rate.      Heart sounds: Normal heart sounds.   Pulmonary:      Effort: Pulmonary effort is normal.      Breath sounds: Normal breath sounds.   Abdominal:      General: There is no distension.      Palpations: Abdomen is soft.      Tenderness: There is abdominal tenderness (Left upper quadrant abdominal--mild tenderness).      Comments: Hypoactive BS   Musculoskeletal:         General: No tenderness.      Right lower leg: No edema.      Left lower leg: No edema.   Skin:     General: Skin is warm and dry.      Coloration: Skin is not jaundiced.   Neurological:      Mental Status: She is alert and oriented to person, place, and time.      Cranial Nerves: No cranial nerve deficit.   Psychiatric:         Behavior: Behavior normal.         Thought Content: Thought content normal.         Consultants     Consult Orders (all) (From admission, onward)     Start     Ordered    02/20/21 1759  [facility] (on-call MD unless specified) Details  Once     Specialty:  Hospitalist  Provider:  (Not yet assigned)    02/20/21 1759              Procedures     Imaging Results (All)     Procedure Component Value Units Date/Time    CT Abdomen Pelvis With Contrast [[id number]] Collected: 02/20/21 2007     Updated: 02/20/21 2020    Narrative:      CT OF THE ABDOMEN AND PELVIS WITH CONTRAST     HISTORY: Abdominal pain and nausea. HISTORY of pancreatitis. Patient is  also undergone Whipple procedure.     COMPARISON: None available.     TECHNIQUE: Axial CT imaging was obtained through the abdomen and pelvis.  IV contrast was administered.     FINDINGS:  Images through the lung bases straight some mild dependent atelectasis.  No focal hepatic lesions are seen. There is no biliary dilatation.  Gallbladder is absent. Spleen and adrenal glands are normal. There are  changes of prior Whipple procedure. There is no evidence of obstruction.  The pancreatic  body and tail appear unremarkable. There is a retroaortic  left renal vein. The kidneys enhance symmetrically. There is no  hydronephrosis. There is no evidence of bowel obstruction. Patient does  have mesenteric edema, uncertain clinical significance. Urinary bladder  is markedly distended. Correlation with any symptoms of urinary  retention is recommended. Uterus is surgically absent. There is a small  amount of free fluid which is noted within the pelvis. There is some  increased stool burden noted throughout the colon, which may represent  some constipation. The appendix cannot be visualized. No acute osseous  abnormalities are seen.       Impression:         1. Changes of prior Whipple procedure. No convincing evidence of  pancreatitis on these images.  2. Patient does appear to have some mesenteric edema, uncertain clinical  significance.  3. Distention of the urinary bladder may reflect urinary retention.  4. Increased stool burden, suggesting constipation.     Radiation dose reduction techniques were utilized, including automated  exposure control and exposure modulation based on body size.     This report was finalized on 2/20/2021 8:17 PM by Dr. Amy Ruth M.D.             Pertinent Labs     Results from last 7 days   Lab Units 02/21/21  0440 02/20/21  1639   WBC 10*3/mm3 4.35 6.31   HEMOGLOBIN g/dL 11.6* 13.5   PLATELETS 10*3/mm3 186 248     Results from last 7 days   Lab Units 02/21/21  0440 02/20/21  1639   SODIUM mmol/L 138 133*   POTASSIUM mmol/L 3.5 3.6   CHLORIDE mmol/L 104 93*   CO2 mmol/L 25.3 31.2*   BUN mg/dL 9 10   CREATININE mg/dL 0.63 0.82   GLUCOSE mg/dL 85 89   Estimated Creatinine Clearance: 85.4 mL/min (by C-G formula based on SCr of 0.63 mg/dL).  Results from last 7 days   Lab Units 02/21/21  0440 02/20/21  1639   ALBUMIN g/dL 3.00* 4.00   BILIRUBIN mg/dL 0.4 0.5   ALK PHOS U/L 41 52   AST (SGOT) U/L 17 22   ALT (SGPT) U/L 10 13     Results from last 7 days   Lab Units  02/21/21  0440 02/20/21  1639   CALCIUM mg/dL 8.1* 9.0   ALBUMIN g/dL 3.00* 4.00   MAGNESIUM mg/dL  --  1.9     Results from last 7 days   Lab Units 02/20/21  1639   AMYLASE U/L 68   LIPASE U/L 17             Invalid input(s): LDLCALC        Test Results Pending at Discharge       Discharge Details        Discharge Medications      New Medications      Instructions Start Date   polyethylene glycol 17 g packet  Commonly known as: MIRALAX   17 g, Oral, Daily, Hold for diarrhea      sennosides-docusate 8.6-50 MG per tablet  Commonly known as: PERICOLACE   1 tablet, Oral, 2 Times Daily PRN, Hold for diarrhea         Continue These Medications      Instructions Start Date   estradiol 2 MG tablet  Commonly known as: ESTRACE   2 mg, Oral, Daily      lamoTRIgine ER 50 MG tablet sustained-release 24 hour   50 mg, Oral, Daily      levothyroxine 100 MCG tablet  Commonly known as: SYNTHROID, LEVOTHROID   100 mcg, Oral, Daily      oxyCODONE 5 MG capsule  Commonly known as: OXY-IR   5 mg, Oral, 4 Times Daily PRN      promethazine 12.5 MG suppository  Commonly known as: PHENERGAN   12.5 mg, Rectal, Every 6 Hours PRN      promethazine 25 MG tablet  Commonly known as: PHENERGAN   12.5-25 mg, Oral, Every 6 Hours PRN      sucralfate 1 g tablet  Commonly known as: CARAFATE   1 g, Oral, Every 4 Hours PRN      topiramate 100 MG tablet  Commonly known as: TOPAMAX   100 mg, Oral, Nightly      triamterene-hydrochlorothiazide 37.5-25 MG per capsule  Commonly known as: DYAZIDE   1 capsule, Oral, Every Morning      Vitamin D3 50 MCG (2000 UT) tablet   2,000 Units, Oral, Daily             Allergies   Allergen Reactions   • Sulfa Antibiotics Nausea Only   • Sumatriptan Nausea And Vomiting     Caused reaction to pancreas; hx of chronic pancreatitis         Discharge Disposition:  Home or Self Care    Discharge Diet:  Diet Order   Procedures   • Diet Full Liquid       Discharge Activity:       CODE STATUS:    Code Status and Medical Interventions:    Ordered at: 02/20/21 1832     Code Status:    CPR     Medical Interventions (Level of Support Prior to Arrest):    Full       No future appointments.  Additional Instructions for the Follow-ups that You Need to Schedule     Discharge Follow-up with PCP   As directed       Currently Documented PCP:    Johnnie Muñiz MD    PCP Phone Number:    [phone]     Follow Up Details: Please call  to schedule a one week or earliest available follow-up appointment PCP         Discharge Follow-up with Specialty: Dr. Correia   As directed      Specialty: Dr. Correia    Follow Up Details: Please call schedule follow-up appoint with Dr. Correia regarding pancreatic referred pain           Follow-up Information     Johnnie Muñiz MD .    Specialty: Internal Medicine  Why: Please call  to schedule a one week or earliest available follow-up appointment PCP  Contact information:  [address] [place] [address] [place] [facility]  [phone]                   Additional Instructions for the Follow-ups that You Need to Schedule     Discharge Follow-up with PCP   As directed       Currently Documented PCP:    Johnnie Muñiz MD    PCP Phone Number:    [phone]     Follow Up Details: Please call  to schedule a one week or earliest available follow-up appointment PCP         Discharge Follow-up with Specialty: Dr. Correia   As directed      Specialty: Dr. Correia    Follow Up Details: Please call schedule follow-up appoint with Dr. Correia regarding pancreatic referred pain           Time Spent on Discharge:  Greater than 30 minutes      [facility]  02/21/21  09:53 EST

## 2021-02-21 NOTE — PLAN OF CARE
Goal Outcome Evaluation:  Plan of Care Reviewed With: patient     Outcome Summary: pt admitted from ER w/ nausea and abd pain, Alert and oriented x4, c/o of pain- dilaudid given, c/o of nausea- pt. refused zofran, Ivf @ 100, am labs ordered, tolerating CLD, voiding freely, up ad kimmy

## 2021-02-21 NOTE — ED NOTES
I wore full protective equipment throughout this patient encounter including a face mask, goggles, and gloves. Hand hygiene was performed before donning protective equipment and after removal when leaving the room.       Ana Farmer RN  02/20/21 1922

## 2021-02-21 NOTE — PROGRESS NOTES
Clinical Pharmacy Services: Medication History    Fartun Kahn is a 60 y.o. female presenting to [facility] for Acute on chronic pancreatitis (CMS/HCC) [K85.90, K86.1]    She  has a past medical history of Bipolar disorder (CMS/HCC), Constipation due to opioid therapy, Hypothyroid, and Hypothyroid.    Allergies as of 02/20/2021 - Reviewed 02/20/2021   Allergen Reaction Noted   • Sulfa antibiotics Nausea Only 10/07/2020   • Sumatriptan Nausea And Vomiting 07/28/2017       Medication information was obtained from: [employer]      Pharmacy and Phone Number: [id number] [place], [place] - [address] AT [facility] OF [place] [address] & [facility] [phone] [facility] [phone]         Prior to Admission Medications     Prescriptions Last Dose Informant Patient Reported? Taking?    Cholecalciferol (Vitamin D3) 50 MCG (2000 UT) tablet 2/20/2021 Self Yes Yes    Take 2,000 Units by mouth Daily.    estradiol (ESTRACE) 2 MG tablet 2/20/2021 Self Yes Yes    Take 2 mg by mouth Daily.    lamoTRIgine ER 50 MG tablet sustained-release 24 hour 2/20/2021 Self Yes Yes    Take 50 mg by mouth Daily.    levothyroxine (SYNTHROID, LEVOTHROID) 100 MCG tablet 2/20/2021 Self Yes Yes    Take 100 mcg by mouth Daily.    oxyCODONE (OXY-IR) 5 MG capsule 2/20/2021 Self Yes Yes    Take 5 mg by mouth 4 (Four) Times a Day As Needed for Moderate Pain  or Severe Pain .    promethazine (PHENERGAN) 12.5 MG suppository 2/20/2021 Self Yes Yes    Insert 12.5 mg into the rectum Every 6 (Six) Hours As Needed for Nausea.    promethazine (PHENERGAN) 25 MG tablet 2/20/2021 Self Yes Yes    Take 12.5-25 mg by mouth Every 6 (Six) Hours As Needed for Nausea.    sucralfate (CARAFATE) 1 g tablet Past Week Self Yes Yes    Take 1 g by mouth Every 4 (Four) Hours As Needed.    topiramate (TOPAMAX) 100 MG tablet 2/19/2021 Self Yes Yes    Take 100 mg by mouth Every Night.    triamterene-hydrochlorothiazide (DYAZIDE) 37.5-25 MG per capsule  2/20/2021 Self Yes Yes    Take 1 capsule by mouth Every Morning.            Medication notes:     This medication list is complete to the best of my knowledge as of 2/20/2021    Please call if questions.    Lavon Mcintyre [facility]  2/20/2021 19:02 EST

## 2021-02-21 NOTE — ED NOTES
"Nursing report ED to floor  Fartun Kahn  60 y.o.  female    HPI (triage note):   Chief Complaint   Patient presents with   • Abdominal Pain       Admitting doctor:   Darlene Mcdonough MD    Admitting diagnosis:   The primary encounter diagnosis was Acute on chronic pancreatitis (CMS/HCC). Diagnoses of Intractable abdominal pain and Nausea were also pertinent to this visit.    Code status:   Current Code Status     Date Active Code Status Order ID Comments User Context       2/20/2021 1832 CPR [id number]  Darlene Mcdonough MD ED     Advance Care Planning Activity      Questions for Current Code Status     Question Answer Comment    Code Status CPR     Medical Interventions (Level of Support Prior to Arrest) Full           Allergies:   Sulfa antibiotics and Sumatriptan    Weight:       02/20/21  1559   Weight: 56.7 kg (125 lb)       Most recent vitals:   Vitals:    02/20/21 1559 02/20/21 1600 02/20/21 1854 02/20/21 1920   BP:  96/67 110/68 109/68   Pulse:  73 57 58   Resp:  18 18 18   Temp:       TempSrc:       SpO2:  99% 97% 96%   Weight: 56.7 kg (125 lb)      Height: 170.2 cm (67\")          Active LDAs/IV Access:   Lines, Drains & Airways    Active LDAs     Name:   Placement date:   Placement time:   Site:   Days:    Peripheral IV 02/20/21 1639 Right Antecubital   02/20/21    1639    Antecubital   less than 1                Labs (abnormal labs have a star):   Labs Reviewed   COMPREHENSIVE METABOLIC PANEL - Abnormal; Notable for the following components:       Result Value    Sodium 133 (*)     Chloride 93 (*)     CO2 31.2 (*)     All other components within normal limits    Narrative:     GFR Normal >60  Chronic Kidney Disease <60  Kidney Failure <15     CBC WITH AUTO DIFFERENTIAL - Abnormal; Notable for the following components:    RDW 11.8 (*)     All other components within normal limits   LIPASE - Normal   URINALYSIS W/ MICROSCOPIC IF INDICATED (NO CULTURE) - Normal    Narrative:     Urine microscopic not indicated. "   MAGNESIUM - Normal   AMYLASE - Normal   COVID PRE-OP / PRE-PROCEDURE SCREENING ORDER (NO ISOLATION)    Narrative:     The following orders were created for panel order COVID PRE-OP / PRE-PROCEDURE SCREENING ORDER (NO ISOLATION) - Swab, Nasopharynx.  Procedure                               Abnormality         Status                     ---------                               -----------         ------                     COVID-19,APTIMA PANTHER,...[[id number]]                      In process                   Please view results for these tests on the individual orders.   COVID-19,APTIMA PANTHER,PATRICIA IN-HOUSE,NP/OP SWAB IN UTM/VTM/SALINE TRANSPORT MEDIA,24 HR TAT   CBC AND DIFFERENTIAL    Narrative:     The following orders were created for panel order CBC & Differential.  Procedure                               Abnormality         Status                     ---------                               -----------         ------                     CBC Auto Differential[[id number]]        Abnormal            Final result                 Please view results for these tests on the individual orders.       EKG:   No orders to display       Meds given in ED:   Medications   lactated ringers bolus 1,000 mL (1,000 mL Intravenous New Bag 2/20/21 1641)   metoclopramide (REGLAN) injection 10 mg (10 mg Intravenous Given 2/20/21 1640)   HYDROmorphone (DILAUDID) injection 1 mg (1 mg Intravenous Given 2/20/21 1641)   HYDROmorphone (DILAUDID) injection 1 mg (1 mg Intravenous Given 2/20/21 1806)   lactated ringers bolus 1,000 mL (0 mL Intravenous Stopped 2/20/21 1851)   iopamidol (ISOVUE-300) 61 % injection 100 mL (85 mL Intravenous Given by Other 2/20/21 1948)       Imaging results:  No radiology results for the last day    Ambulatory status:   - up ad kimmy    Social issues:   Social History     Socioeconomic History   • Marital status:      Spouse name: Not on file   • Number of children: Not on file   • Years of education: Not  on file   • Highest education level: Not on file    Nursing report ED to floor       Darian, RON Perez  02/20/21 1955

## 2021-02-22 NOTE — PROGRESS NOTES
Case Management Discharge Note      Final Note: Discharged home. Kendra Gonzales, RON         Transportation Services  Private: Car    Final Discharge Disposition Code: 01 - home or self-care

## 2021-06-21 PROCEDURE — U0003 INFECTIOUS AGENT DETECTION BY NUCLEIC ACID (DNA OR RNA); SEVERE ACUTE RESPIRATORY SYNDROME CORONAVIRUS 2 (SARS-COV-2) (CORONAVIRUS DISEASE [COVID-19]), AMPLIFIED PROBE TECHNIQUE, MAKING USE OF HIGH THROUGHPUT TECHNOLOGIES AS DESCRIBED BY CMS-2020-01-R: HCPCS | Performed by: NURSE PRACTITIONER

## 2021-06-23 ENCOUNTER — TELEPHONE (OUTPATIENT)
Dept: URGENT CARE | Facility: CLINIC | Age: 61
End: 2021-06-23

## 2021-06-24 ENCOUNTER — APPOINTMENT (OUTPATIENT)
Dept: CT IMAGING | Facility: HOSPITAL | Age: 61
End: 2021-06-24

## 2021-06-24 ENCOUNTER — HOSPITAL ENCOUNTER (EMERGENCY)
Facility: HOSPITAL | Age: 61
Discharge: HOME | End: 2021-06-24
Admitting: EMERGENCY MEDICINE

## 2021-06-24 VITALS
RESPIRATION RATE: 16 BRPM | BODY MASS INDEX: 18.99 KG/M2 | HEIGHT: 66 IN | WEIGHT: 118.17 LBS | DIASTOLIC BLOOD PRESSURE: 63 MMHG | TEMPERATURE: 98.1 F | HEART RATE: 73 BPM | SYSTOLIC BLOOD PRESSURE: 97 MMHG | OXYGEN SATURATION: 100 %

## 2021-06-24 DIAGNOSIS — R10.9 ABDOMINAL PAIN, UNSPECIFIED ABDOMINAL LOCATION: Primary | ICD-10-CM

## 2021-06-24 DIAGNOSIS — R11.2 NAUSEA AND VOMITING, INTRACTABILITY OF VOMITING NOT SPECIFIED, UNSPECIFIED VOMITING TYPE: ICD-10-CM

## 2021-06-24 LAB
ALBUMIN SERPL-MCNC: 4.3 G/DL (ref 3.5–5.2)
ALBUMIN/GLOB SERPL: 1.5 G/DL
ALP SERPL-CCNC: 82 U/L (ref 39–117)
ALT SERPL W P-5'-P-CCNC: 7 U/L (ref 1–33)
AMYLASE SERPL-CCNC: 41 U/L (ref 28–100)
ANION GAP SERPL CALCULATED.3IONS-SCNC: 13 MMOL/L (ref 5–15)
AST SERPL-CCNC: 17 U/L (ref 1–32)
BASOPHILS # BLD AUTO: 0 10*3/MM3 (ref 0–0.2)
BASOPHILS NFR BLD AUTO: 0.9 % (ref 0–1.5)
BILIRUB SERPL-MCNC: 0.5 MG/DL (ref 0–1.2)
BUN SERPL-MCNC: 8 MG/DL (ref 6–20)
BUN/CREAT SERPL: 9.8 (ref 7–25)
CALCIUM SPEC-SCNC: 9.2 MG/DL (ref 8.6–10.5)
CHLORIDE SERPL-SCNC: 90 MMOL/L (ref 98–107)
CO2 SERPL-SCNC: 26 MMOL/L (ref 22–29)
CREAT SERPL-MCNC: 0.82 MG/DL (ref 0.57–1)
DEPRECATED RDW RBC AUTO: 37.6 FL (ref 37–54)
EOSINOPHIL # BLD AUTO: 0.1 10*3/MM3 (ref 0–0.4)
EOSINOPHIL NFR BLD AUTO: 1.5 % (ref 0.3–6.2)
ERYTHROCYTE [DISTWIDTH] IN BLOOD BY AUTOMATED COUNT: 12.2 % (ref 12.3–15.4)
GFR SERPL CREATININE-BSD FRML MDRD: 71 ML/MIN/1.73
GLOBULIN UR ELPH-MCNC: 2.9 GM/DL
GLUCOSE SERPL-MCNC: 98 MG/DL (ref 65–99)
HCT VFR BLD AUTO: 44.8 % (ref 34–46.6)
HGB BLD-MCNC: 15.6 G/DL (ref 12–15.9)
LIPASE SERPL-CCNC: 25 U/L (ref 13–60)
LYMPHOCYTES # BLD AUTO: 2.2 10*3/MM3 (ref 0.7–3.1)
LYMPHOCYTES NFR BLD AUTO: 39.9 % (ref 19.6–45.3)
MCH RBC QN AUTO: 30.8 PG (ref 26.6–33)
MCHC RBC AUTO-ENTMCNC: 34.8 G/DL (ref 31.5–35.7)
MCV RBC AUTO: 88.6 FL (ref 79–97)
MONOCYTES # BLD AUTO: 0.4 10*3/MM3 (ref 0.1–0.9)
MONOCYTES NFR BLD AUTO: 7.8 % (ref 5–12)
NEUTROPHILS NFR BLD AUTO: 2.8 10*3/MM3 (ref 1.7–7)
NEUTROPHILS NFR BLD AUTO: 49.9 % (ref 42.7–76)
NRBC BLD AUTO-RTO: 0 /100 WBC (ref 0–0.2)
PLATELET # BLD AUTO: 220 10*3/MM3 (ref 140–450)
PMV BLD AUTO: 7.4 FL (ref 6–12)
POTASSIUM SERPL-SCNC: 3.3 MMOL/L (ref 3.5–5.2)
PROT SERPL-MCNC: 7.2 G/DL (ref 6–8.5)
RBC # BLD AUTO: 5.05 10*6/MM3 (ref 3.77–5.28)
SODIUM SERPL-SCNC: 129 MMOL/L (ref 136–145)
WBC # BLD AUTO: 5.6 10*3/MM3 (ref 3.4–10.8)

## 2021-06-24 PROCEDURE — 25010000002 PROCHLORPERAZINE 10 MG/2ML SOLUTION: Performed by: PHYSICIAN ASSISTANT

## 2021-06-24 PROCEDURE — 74177 CT ABD & PELVIS W/CONTRAST: CPT

## 2021-06-24 PROCEDURE — 25010000002 DIPHENHYDRAMINE PER 50 MG: Performed by: PHYSICIAN ASSISTANT

## 2021-06-24 PROCEDURE — 0 IOPAMIDOL PER 1 ML: Performed by: PHYSICIAN ASSISTANT

## 2021-06-24 PROCEDURE — 82150 ASSAY OF AMYLASE: CPT | Performed by: PHYSICIAN ASSISTANT

## 2021-06-24 PROCEDURE — 99283 EMERGENCY DEPT VISIT LOW MDM: CPT

## 2021-06-24 PROCEDURE — 80053 COMPREHEN METABOLIC PANEL: CPT | Performed by: PHYSICIAN ASSISTANT

## 2021-06-24 PROCEDURE — 96374 THER/PROPH/DIAG INJ IV PUSH: CPT

## 2021-06-24 PROCEDURE — 25010000002 MORPHINE PER 10 MG: Performed by: PHYSICIAN ASSISTANT

## 2021-06-24 PROCEDURE — 85025 COMPLETE CBC W/AUTO DIFF WBC: CPT | Performed by: PHYSICIAN ASSISTANT

## 2021-06-24 PROCEDURE — 83690 ASSAY OF LIPASE: CPT | Performed by: PHYSICIAN ASSISTANT

## 2021-06-24 PROCEDURE — 96375 TX/PRO/DX INJ NEW DRUG ADDON: CPT

## 2021-06-24 PROCEDURE — 25010000002 ONDANSETRON PER 1 MG: Performed by: PHYSICIAN ASSISTANT

## 2021-06-24 RX ORDER — HYDROCODONE BITARTRATE AND ACETAMINOPHEN 10; 325 MG/1; MG/1
1 TABLET ORAL EVERY 6 HOURS PRN
Qty: 15 TABLET | Refills: 0 | Status: SHIPPED | OUTPATIENT
Start: 2021-06-24 | End: 2021-06-24

## 2021-06-24 RX ORDER — SODIUM CHLORIDE 0.9 % (FLUSH) 0.9 %
10 SYRINGE (ML) INJECTION AS NEEDED
Status: DISCONTINUED | OUTPATIENT
Start: 2021-06-24 | End: 2021-06-24 | Stop reason: HOSPADM

## 2021-06-24 RX ORDER — DIPHENHYDRAMINE HYDROCHLORIDE 50 MG/ML
25 INJECTION INTRAMUSCULAR; INTRAVENOUS ONCE
Status: DISCONTINUED | OUTPATIENT
Start: 2021-06-24 | End: 2021-06-24 | Stop reason: HOSPADM

## 2021-06-24 RX ORDER — PROCHLORPERAZINE EDISYLATE 5 MG/ML
10 INJECTION INTRAMUSCULAR; INTRAVENOUS ONCE
Status: COMPLETED | OUTPATIENT
Start: 2021-06-24 | End: 2021-06-24

## 2021-06-24 RX ORDER — HYDROCODONE BITARTRATE AND ACETAMINOPHEN 10; 325 MG/1; MG/1
1 TABLET ORAL EVERY 6 HOURS PRN
Qty: 15 TABLET | Refills: 0 | Status: SHIPPED | OUTPATIENT
Start: 2021-06-24

## 2021-06-24 RX ORDER — MORPHINE SULFATE 4 MG/ML
4 INJECTION, SOLUTION INTRAMUSCULAR; INTRAVENOUS ONCE
Status: COMPLETED | OUTPATIENT
Start: 2021-06-24 | End: 2021-06-24

## 2021-06-24 RX ORDER — ONDANSETRON 2 MG/ML
4 INJECTION INTRAMUSCULAR; INTRAVENOUS ONCE
Status: COMPLETED | OUTPATIENT
Start: 2021-06-24 | End: 2021-06-24

## 2021-06-24 RX ADMIN — PROCHLORPERAZINE EDISYLATE 10 MG: 5 INJECTION INTRAMUSCULAR; INTRAVENOUS at 15:53

## 2021-06-24 RX ADMIN — IOPAMIDOL 80 ML: 755 INJECTION, SOLUTION INTRAVENOUS at 14:59

## 2021-06-24 RX ADMIN — MORPHINE SULFATE 4 MG: 4 INJECTION INTRAVENOUS at 14:05

## 2021-06-24 RX ADMIN — ONDANSETRON 4 MG: 2 INJECTION INTRAMUSCULAR; INTRAVENOUS at 14:05

## 2021-06-24 RX ADMIN — SODIUM CHLORIDE 1000 ML: 9 INJECTION, SOLUTION INTRAVENOUS at 14:05

## 2022-10-28 ENCOUNTER — HOSPITAL ENCOUNTER (EMERGENCY)
Facility: HOSPITAL | Age: 62
Discharge: HOME | End: 2022-10-28
Attending: EMERGENCY MEDICINE | Admitting: EMERGENCY MEDICINE

## 2022-10-28 ENCOUNTER — APPOINTMENT (OUTPATIENT)
Dept: CT IMAGING | Facility: HOSPITAL | Age: 62
End: 2022-10-28

## 2022-10-28 VITALS
WEIGHT: 127 LBS | BODY MASS INDEX: 20.41 KG/M2 | OXYGEN SATURATION: 97 % | SYSTOLIC BLOOD PRESSURE: 102 MMHG | HEIGHT: 66 IN | HEART RATE: 54 BPM | RESPIRATION RATE: 18 BRPM | DIASTOLIC BLOOD PRESSURE: 67 MMHG | TEMPERATURE: 97.7 F

## 2022-10-28 DIAGNOSIS — R10.9 CHRONIC ABDOMINAL PAIN: ICD-10-CM

## 2022-10-28 DIAGNOSIS — K59.03 DRUG-INDUCED CONSTIPATION: Primary | ICD-10-CM

## 2022-10-28 DIAGNOSIS — G89.29 CHRONIC ABDOMINAL PAIN: ICD-10-CM

## 2022-10-28 LAB
ALBUMIN SERPL-MCNC: 4.76 G/DL (ref 3.5–5.2)
ALBUMIN/GLOB SERPL: 1.7 G/DL
ALP SERPL-CCNC: 68 U/L (ref 39–117)
ALT SERPL W P-5'-P-CCNC: 10 U/L (ref 1–33)
ANION GAP SERPL CALCULATED.3IONS-SCNC: 11.2 MMOL/L (ref 5–15)
AST SERPL-CCNC: 18 U/L (ref 1–32)
BASOPHILS # BLD AUTO: 0.04 10*3/MM3 (ref 0–0.2)
BASOPHILS NFR BLD AUTO: 1 % (ref 0–1.5)
BILIRUB SERPL-MCNC: 0.5 MG/DL (ref 0–1.2)
BILIRUB UR QL STRIP: NEGATIVE
BUN SERPL-MCNC: 9 MG/DL (ref 8–23)
BUN/CREAT SERPL: 11.4 (ref 7–25)
CALCIUM SPEC-SCNC: 9.7 MG/DL (ref 8.6–10.5)
CHLORIDE SERPL-SCNC: 107 MMOL/L (ref 98–107)
CLARITY UR: CLEAR
CO2 SERPL-SCNC: 21.8 MMOL/L (ref 22–29)
COLOR UR: YELLOW
CREAT SERPL-MCNC: 0.79 MG/DL (ref 0.57–1)
DEPRECATED RDW RBC AUTO: 42.1 FL (ref 37–54)
EGFRCR SERPLBLD CKD-EPI 2021: 85.8 ML/MIN/1.73
EOSINOPHIL # BLD AUTO: 0.07 10*3/MM3 (ref 0–0.4)
EOSINOPHIL NFR BLD AUTO: 1.7 % (ref 0.3–6.2)
ERYTHROCYTE [DISTWIDTH] IN BLOOD BY AUTOMATED COUNT: 12.6 % (ref 12.3–15.4)
GLOBULIN UR ELPH-MCNC: 2.7 GM/DL
GLUCOSE SERPL-MCNC: 101 MG/DL (ref 65–99)
GLUCOSE UR STRIP-MCNC: NEGATIVE MG/DL
HCT VFR BLD AUTO: 40.3 % (ref 34–46.6)
HGB BLD-MCNC: 13.5 G/DL (ref 12–15.9)
HGB UR QL STRIP.AUTO: NEGATIVE
IMM GRANULOCYTES # BLD AUTO: 0.01 10*3/MM3 (ref 0–0.05)
IMM GRANULOCYTES NFR BLD AUTO: 0.2 % (ref 0–0.5)
KETONES UR QL STRIP: NEGATIVE
LEUKOCYTE ESTERASE UR QL STRIP.AUTO: NEGATIVE
LIPASE SERPL-CCNC: 26 U/L (ref 13–60)
LYMPHOCYTES # BLD AUTO: 1.62 10*3/MM3 (ref 0.7–3.1)
LYMPHOCYTES NFR BLD AUTO: 39.3 % (ref 19.6–45.3)
MCH RBC QN AUTO: 30.1 PG (ref 26.6–33)
MCHC RBC AUTO-ENTMCNC: 33.5 G/DL (ref 31.5–35.7)
MCV RBC AUTO: 90 FL (ref 79–97)
MONOCYTES # BLD AUTO: 0.34 10*3/MM3 (ref 0.1–0.9)
MONOCYTES NFR BLD AUTO: 8.3 % (ref 5–12)
NEUTROPHILS NFR BLD AUTO: 2.04 10*3/MM3 (ref 1.7–7)
NEUTROPHILS NFR BLD AUTO: 49.5 % (ref 42.7–76)
NITRITE UR QL STRIP: NEGATIVE
PH UR STRIP.AUTO: 7 [PH] (ref 5–8)
PLATELET # BLD AUTO: 185 10*3/MM3 (ref 140–450)
PMV BLD AUTO: 10.5 FL (ref 6–12)
POTASSIUM SERPL-SCNC: 3.8 MMOL/L (ref 3.5–5.2)
PROT SERPL-MCNC: 7.5 G/DL (ref 6–8.5)
PROT UR QL STRIP: NEGATIVE
RBC # BLD AUTO: 4.48 10*6/MM3 (ref 3.77–5.28)
SODIUM SERPL-SCNC: 140 MMOL/L (ref 136–145)
SP GR UR STRIP: 1.01 (ref 1–1.03)
UROBILINOGEN UR QL STRIP: NORMAL
WBC NRBC COR # BLD: 4.12 10*3/MM3 (ref 3.4–10.8)

## 2022-10-28 PROCEDURE — 85025 COMPLETE CBC W/AUTO DIFF WBC: CPT | Performed by: EMERGENCY MEDICINE

## 2022-10-28 PROCEDURE — 99283 EMERGENCY DEPT VISIT LOW MDM: CPT | Performed by: EMERGENCY MEDICINE

## 2022-10-28 PROCEDURE — 87086 URINE CULTURE/COLONY COUNT: CPT | Performed by: EMERGENCY MEDICINE

## 2022-10-28 PROCEDURE — 96375 TX/PRO/DX INJ NEW DRUG ADDON: CPT

## 2022-10-28 PROCEDURE — 99283 EMERGENCY DEPT VISIT LOW MDM: CPT

## 2022-10-28 PROCEDURE — 96361 HYDRATE IV INFUSION ADD-ON: CPT

## 2022-10-28 PROCEDURE — 80053 COMPREHEN METABOLIC PANEL: CPT | Performed by: EMERGENCY MEDICINE

## 2022-10-28 PROCEDURE — 74176 CT ABD & PELVIS W/O CONTRAST: CPT

## 2022-10-28 PROCEDURE — 81003 URINALYSIS AUTO W/O SCOPE: CPT | Performed by: EMERGENCY MEDICINE

## 2022-10-28 PROCEDURE — 25010000002 HYDROMORPHONE 1 MG/ML SOLUTION: Performed by: EMERGENCY MEDICINE

## 2022-10-28 PROCEDURE — 25010000002 METOCLOPRAMIDE PER 10 MG: Performed by: EMERGENCY MEDICINE

## 2022-10-28 PROCEDURE — 83690 ASSAY OF LIPASE: CPT | Performed by: EMERGENCY MEDICINE

## 2022-10-28 PROCEDURE — 96374 THER/PROPH/DIAG INJ IV PUSH: CPT

## 2022-10-28 RX ORDER — FAMOTIDINE 10 MG/ML
40 INJECTION, SOLUTION INTRAVENOUS ONCE
Status: COMPLETED | OUTPATIENT
Start: 2022-10-28 | End: 2022-10-28

## 2022-10-28 RX ORDER — METOCLOPRAMIDE HYDROCHLORIDE 5 MG/ML
10 INJECTION INTRAMUSCULAR; INTRAVENOUS ONCE
Status: COMPLETED | OUTPATIENT
Start: 2022-10-28 | End: 2022-10-28

## 2022-10-28 RX ADMIN — METOCLOPRAMIDE 10 MG: 5 INJECTION, SOLUTION INTRAMUSCULAR; INTRAVENOUS at 09:19

## 2022-10-28 RX ADMIN — SODIUM CHLORIDE 1000 ML: 9 INJECTION, SOLUTION INTRAVENOUS at 09:18

## 2022-10-28 RX ADMIN — FAMOTIDINE 40 MG: 10 INJECTION INTRAVENOUS at 09:18

## 2022-10-28 RX ADMIN — HYDROMORPHONE HYDROCHLORIDE 2 MG: 1 INJECTION, SOLUTION INTRAMUSCULAR; INTRAVENOUS; SUBCUTANEOUS at 09:19

## 2022-10-29 LAB — BACTERIA SPEC AEROBE CULT: NORMAL

## 2022-11-23 ENCOUNTER — OFFICE VISIT (OUTPATIENT)
Dept: NEUROLOGY | Facility: CLINIC | Age: 62
End: 2022-11-23

## 2022-11-23 VITALS
WEIGHT: 125 LBS | OXYGEN SATURATION: 99 % | HEART RATE: 59 BPM | DIASTOLIC BLOOD PRESSURE: 68 MMHG | HEIGHT: 66 IN | BODY MASS INDEX: 20.09 KG/M2 | SYSTOLIC BLOOD PRESSURE: 108 MMHG

## 2022-11-23 DIAGNOSIS — R41.89 COGNITIVE IMPAIRMENT: ICD-10-CM

## 2022-11-23 DIAGNOSIS — M54.81 OCCIPITAL NEURALGIA OF LEFT SIDE: Primary | ICD-10-CM

## 2022-11-23 DIAGNOSIS — G43.009 MIGRAINE WITHOUT AURA AND WITHOUT STATUS MIGRAINOSUS, NOT INTRACTABLE: ICD-10-CM

## 2022-11-23 DIAGNOSIS — Z87.828 HISTORY OF TRAUMATIC HEAD INJURY: ICD-10-CM

## 2022-11-23 DIAGNOSIS — R51.9 CHRONIC DAILY HEADACHE: ICD-10-CM

## 2022-11-23 PROBLEM — R10.9 ABDOMINAL PAIN: Status: ACTIVE | Noted: 2018-08-27

## 2022-11-23 PROBLEM — R23.3 PETECHIAE: Status: ACTIVE | Noted: 2020-07-10

## 2022-11-23 PROBLEM — K31.84 GASTROPARESIS: Status: ACTIVE | Noted: 2019-08-28

## 2022-11-23 PROBLEM — F31.9 BIPOLAR DISORDER: Status: ACTIVE | Noted: 2021-04-27

## 2022-11-23 PROBLEM — F41.9 ANXIETY: Status: ACTIVE | Noted: 2020-12-04

## 2022-11-23 PROBLEM — R79.0 LOW FERRITIN LEVEL: Status: ACTIVE | Noted: 2019-06-25

## 2022-11-23 PROBLEM — R53.83 FATIGUE: Status: ACTIVE | Noted: 2020-07-16

## 2022-11-23 PROBLEM — K91.2 POSTSURGICAL MALABSORPTION: Status: ACTIVE | Noted: 2020-10-26

## 2022-11-23 PROBLEM — R11.0 NAUSEA: Status: ACTIVE | Noted: 2021-04-27

## 2022-11-23 PROCEDURE — 99204 OFFICE O/P NEW MOD 45 MIN: CPT | Performed by: PSYCHIATRY & NEUROLOGY

## 2022-11-23 RX ORDER — HYDROXYZINE PAMOATE 25 MG/1
CAPSULE ORAL
COMMUNITY

## 2022-11-23 RX ORDER — UBIDECARENONE 75 MG
50 CAPSULE ORAL DAILY
COMMUNITY

## 2022-11-23 RX ORDER — BUSPIRONE HYDROCHLORIDE 15 MG/1
TABLET ORAL
COMMUNITY

## 2022-11-23 RX ORDER — MELATONIN
1000
COMMUNITY

## 2022-11-23 RX ORDER — LAMOTRIGINE 200 MG/1
TABLET ORAL
COMMUNITY

## 2022-11-23 RX ORDER — PANCRELIPASE 36000; 180000; 114000 [USP'U]/1; [USP'U]/1; [USP'U]/1
CAPSULE, DELAYED RELEASE PELLETS ORAL
COMMUNITY

## 2022-11-23 RX ORDER — CHLORHEXIDINE GLUCONATE 213 G/1000ML
SOLUTION TOPICAL
COMMUNITY

## 2022-11-23 RX ORDER — GABAPENTIN 100 MG/1
CAPSULE ORAL
COMMUNITY

## 2022-11-23 RX ORDER — LIDOCAINE 36 MG/1
PATCH TOPICAL
COMMUNITY

## 2022-11-23 RX ORDER — RIZATRIPTAN BENZOATE 10 MG/1
10 TABLET, ORALLY DISINTEGRATING ORAL
COMMUNITY
Start: 2022-08-01

## 2022-11-23 RX ORDER — CLONIDINE HYDROCHLORIDE 0.1 MG/1
TABLET ORAL
COMMUNITY

## 2022-11-23 RX ORDER — CEPHALEXIN 250 MG/1
CAPSULE ORAL
COMMUNITY
Start: 2022-09-30

## 2022-11-23 RX ORDER — HYDROMORPHONE HYDROCHLORIDE 2 MG/1
TABLET ORAL
COMMUNITY
Start: 2022-11-15

## 2022-11-23 RX ORDER — ALBUTEROL SULFATE 90 UG/1
AEROSOL, METERED RESPIRATORY (INHALATION)
COMMUNITY
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
## 2023-02-15 ENCOUNTER — TELEPHONE (OUTPATIENT)
Dept: NEUROLOGY | Facility: CLINIC | Age: 63
End: 2023-02-15

## 2023-02-15 NOTE — TELEPHONE ENCOUNTER
Caller: STEPHAN Diehl call back number: [phone]    What was the call regarding: PT NEEDS APPT WAS WITH [facility] NOW BACK . NEEDS INJ FOR OCCIPITAL BLOCK  SCHED. PERFER TUES/THURS    Do you require a callback: YES          PLEASE ADVISE.

## 2023-04-14 ENCOUNTER — TELEPHONE (OUTPATIENT)
Dept: NEUROLOGY | Facility: CLINIC | Age: 63
End: 2023-04-14
Payer: COMMERCIAL

## 2023-04-20 ENCOUNTER — PROCEDURE VISIT (OUTPATIENT)
Dept: NEUROLOGY | Facility: CLINIC | Age: 63
End: 2023-04-20
Payer: COMMERCIAL

## 2023-04-20 DIAGNOSIS — M54.81 OCCIPITAL NEURALGIA OF LEFT SIDE: Primary | ICD-10-CM

## 2023-04-20 RX ORDER — METHYLPREDNISOLONE ACETATE 40 MG/ML
20 INJECTION, SUSPENSION INTRA-ARTICULAR; INTRALESIONAL; INTRAMUSCULAR; SOFT TISSUE ONCE
Status: COMPLETED | OUTPATIENT
Start: 2023-04-20 | End: 2023-04-20

## 2023-04-20 RX ORDER — LIDOCAINE HYDROCHLORIDE 10 MG/ML
1 INJECTION, SOLUTION INFILTRATION; PERINEURAL ONCE
Status: COMPLETED | OUTPATIENT
Start: 2023-04-20 | End: 2023-04-20

## 2023-04-20 RX ADMIN — METHYLPREDNISOLONE ACETATE 20 MG: 40 INJECTION, SUSPENSION INTRA-ARTICULAR; INTRALESIONAL; INTRAMUSCULAR; SOFT TISSUE at 11:27

## 2023-04-20 RX ADMIN — LIDOCAINE HYDROCHLORIDE 1 ML: 10 INJECTION, SOLUTION INFILTRATION; PERINEURAL at 11:26

## 2023-04-20 NOTE — PROGRESS NOTES
"Procedure   Nerve Block    Date/Time: 4/20/2023 11:42 AM  Performed by: Glen Uriostegui II, MD  Authorized by: Glen Uriostegui II, MD   Consent: Verbal consent obtained. Written consent obtained.  Risks and benefits: risks, benefits and alternatives were discussed  Consent given by: patient  Patient understanding: patient states understanding of the procedure being performed  Patient consent: the patient's understanding of the procedure matches consent given  Procedure consent: procedure consent matches procedure scheduled  Required items: required blood products, implants, devices, and special equipment available  Patient identity confirmed: verbally with patient and provided demographic data  Time out: Immediately prior to procedure a \"time out\" was called to verify the correct patient, procedure, equipment, support staff and site/side marked as required.  Indications comments: occipital neuralgia  Body area: head  Nerve: greater occipital  Laterality: Bilateral.    Sedation:  Patient sedated: no    Patient position: sitting  Needle size: 27 G  Location technique: anatomical landmarks  Patient tolerance: Patient tolerated the procedure well with no immediate complications  Comments: .5 cc of both Depo-Medrol and 2% lidocaine without epinephrine were drawn into 1, 3 cc syringe.  The full cc of this mixture was injected at the site of the greater occipital nerve, left.         ONB  "

## 2023-05-31 ENCOUNTER — TELEPHONE (OUTPATIENT)
Dept: NEUROLOGY | Facility: CLINIC | Age: 63
End: 2023-05-31

## 2023-07-03 ENCOUNTER — TELEPHONE (OUTPATIENT)
Dept: NEUROLOGY | Facility: CLINIC | Age: 63
End: 2023-07-03

## 2023-07-03 NOTE — TELEPHONE ENCOUNTER
PATIENT CALLING TO ADVISE THAT SHE HAS PASSED OUT BEFORE - DX WAS COMPLICATED MIGRAINE - MAYBE 5-6 YRS AGO AND ANOTHER AT WORK, NOT AS BAD ABOUT 3 YR AGO AND THE MOST RECENT A FEW MONTHS AGO.    PATIENT STATES THESE WOULD BE IN HER CHART ([facility]).

## 2023-08-24 ENCOUNTER — TELEPHONE (OUTPATIENT)
Dept: NEUROLOGY | Facility: CLINIC | Age: 63
End: 2023-08-24

## 2023-08-24 NOTE — TELEPHONE ENCOUNTER
Provider: AIDE  Caller: STEPHAN  Phone Number: [phone]   Reason for Call: PT CALLED TO RESCHEDULE APPT THAT PT SAID WAS FOR SEPTEMBER 4TH AND SHE CAN ONLY DO THURSDAY APPTS. INFORMED PT OF APPT BEING PUSHED OUT UNTIL DECEMBER FOR A 6 MONTH FOLLOW UP. PT STATES THAT SHE IS CONFUSED WHY IT WOULD BE PUSHED OUT THAT FAR WITH HER JUST HAVING A MRI COMPLETED AND WANTING TO GO OVER RESULTS. PT WOULD LIKE A CALL BACK ON WHEN SHE IS NEEDING TO COME IN.    PLEASE REVIEW AND ADVISEE.  THANK YOU

## 2023-08-25 ENCOUNTER — TELEPHONE (OUTPATIENT)
Dept: NEUROLOGY | Facility: CLINIC | Age: 63
End: 2023-08-25
Payer: COMMERCIAL

## 2023-08-25 NOTE — TELEPHONE ENCOUNTER
Called pt to r/s for a sooner appt. Pt was scheduled in Dec & provider wanted her in sooner. Pt was placed in provider's 1st available spot on a Thurs (per pt's preference), Oct 10

## 2023-10-05 ENCOUNTER — OFFICE VISIT (OUTPATIENT)
Dept: NEUROLOGY | Facility: CLINIC | Age: 63
End: 2023-10-05
Payer: COMMERCIAL

## 2023-10-05 ENCOUNTER — TELEPHONE (OUTPATIENT)
Dept: NEUROLOGY | Facility: CLINIC | Age: 63
End: 2023-10-05

## 2023-10-05 VITALS
DIASTOLIC BLOOD PRESSURE: 62 MMHG | SYSTOLIC BLOOD PRESSURE: 112 MMHG | WEIGHT: 137 LBS | HEART RATE: 66 BPM | OXYGEN SATURATION: 97 % | BODY MASS INDEX: 22.02 KG/M2 | HEIGHT: 66 IN

## 2023-10-05 DIAGNOSIS — G43.009 MIGRAINE WITHOUT AURA AND WITHOUT STATUS MIGRAINOSUS, NOT INTRACTABLE: Primary | ICD-10-CM

## 2023-10-05 DIAGNOSIS — G54.2 CERVICAL NERVE ROOT IMPINGEMENT: ICD-10-CM

## 2023-10-05 NOTE — PROGRESS NOTES
DOS: 10/5/2023  NAME: Fartun Grier   : 1960  PCP: Johnnie Muñiz MD    Chief Complaint   Patient presents with    Occipital neuralgia of left side      SUBJECTIVE  Neurological Problem:  63 y.o. RHW female with a past medical history of migraine headaches, chronic daily headaches, chronic pancreatitis, recurrent abdominal pain with hospitalizations, BPPV, bipolar disorder, hypothyroidism, hyperlipidemia. They are seen in follow up today for migraine headaches. Patient last seen by myself in 2023, with a summary of the history taken from the previous note with additions/modifications as indicated. She is unaccompanied.      Interval History:   **For previous detailed history please see progress note dated 2023.    Mrs. Grier is a patient of Dr. Adrian, last seen by myself in 2023 for history of headaches.  She was prescribed topiramate 200 mg nightly for prevention and rizatriptan for abortive therapy.  Her headaches were described as beginning in the left neck area and radiating into the left occipital head region, at times associated with nausea however she did not experience significant sensitivity to light or sound. She was started on Emgality at her last visit but had a reaction to the medication, reported angioedema so the medication was discontinued immediately.  She reported she was unable to look to the left due to it causing pain and triggering headaches.  She also had associated paresthesias of the left upper extremity with her neck pain.  An MRI C-spine was completed 2023 showing severe left and moderate right foraminal narrowing with chronic bilateral C6 impingement, severe left foraminal narrowing at C6-C7 with chronic left C7 impingement and degenerative changes at other levels.    She presents today for follow-up and reports that her headaches have been much better.  He says that within the past 30 days she has had less than 5 headaches in of those 5, 2 have  been migrainous requiring rizatriptan.  She is continuing to take her Topamax 200 mg nightly.  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  She also reports that in the past several months she has developed tinnitus in the left ear which is not constant and isn't very bothersome, just noticeable.  She has had no falls, no changes in gait.  She reports no other health issues.  She denies dizziness, visual or speech disturbances, facial droop, unilateral extremity weakness/numbness.    Review of Systems   Neurological:  Positive for headaches (improved since last visit). Negative for dizziness, tremors, seizures, syncope, facial asymmetry, speech difficulty, weakness, light-headedness and numbness.   Psychiatric/Behavioral:  Negative for agitation, behavioral problems, confusion, decreased concentration, dysphoric mood, hallucinations, self-injury, sleep disturbance and suicidal ideas. The patient is not nervous/anxious and is not hyperactive.       The following portions of the patient's history were reviewed and updated as appropriate: allergies, current medications, past family history, past medical history, past social history, past surgical history and problem list.    Current Medications:   Current Outpatient Medications:     albuterol sulfate  (90 Base) MCG/ACT inhaler, albuterol sulfate HFA 90 mcg/actuation aerosol inhaler, Disp: , Rfl:     cholecalciferol (VITAMIN D3) 25 MCG (1000 UT) tablet, Take 1 tablet by mouth., Disp: , Rfl:     estradiol (ESTRACE) 2 MG tablet, Take 1 tablet by mouth Daily., Disp: , Rfl:     Fluticasone-Salmeterol (ADVAIR/WIXELA) 250-50 MCG/ACT DISKUS, Inhale 1 puff., Disp: , Rfl:     HYDROmorphone (DILAUDID) 2 MG tablet, , Disp: , Rfl:     levothyroxine (SYNTHROID, LEVOTHROID) 100 MCG tablet, Take 1 tablet by mouth Daily., Disp: , Rfl:     promethazine (PHENERGAN) 12.5 MG  suppository, Insert 1 suppository into the rectum Every 6 (Six) Hours As Needed for Nausea., Disp: , Rfl:     promethazine (PHENERGAN) 25 MG tablet, Take 0.5-1 tablets by mouth Every 6 (Six) Hours As Needed for Nausea., Disp: , Rfl:     rizatriptan MLT (MAXALT-MLT) 10 MG disintegrating tablet, Place 1 tablet on the tongue 1 (One) Time As Needed for Migraine., Disp: 12 tablet, Rfl: 2    topiramate (TOPAMAX) 200 MG tablet, Take 1 tablet by mouth Daily., Disp: , Rfl:     triamterene-hydrochlorothiazide (DYAZIDE) 37.5-25 MG per capsule, Take 1 capsule by mouth Every Morning., Disp: , Rfl:     vitamin B-12 (CYANOCOBALAMIN) 100 MCG tablet, Take 0.5 tablets by mouth Daily., Disp: , Rfl:     Zinc Sulfate (ZINC 15 PO), Take  by mouth., Disp: , Rfl:     Atogepant 10 MG tablet, Take 10 mg by mouth Daily. (Patient not taking: Reported on 6/27/2023), Disp: , Rfl:     brompheniramine-pseudoephedrine-DM (Bromfed DM) 30-2-10 MG/5ML syrup, Take 5 mL by mouth 4 (Four) Times a Day As Needed for Cough. (Patient not taking: Reported on 6/27/2023), Disp: 119 mL, Rfl: 0    busPIRone (BUSPAR) 15 MG tablet, buspirone 15 mg tablet (Patient not taking: Reported on 6/27/2023), Disp: , Rfl:     cephalexin (KEFLEX) 250 MG capsule, , Disp: , Rfl:     chlorhexidine (Hibiclens) 4 % external liquid, Hibiclens 4 % topical liquid  Wash back and gluteal region with Hibiclens soap the night before and morning of procedure (Patient not taking: Reported on 6/27/2023), Disp: , Rfl:     Cholecalciferol (Vitamin D3) 50 MCG (2000 UT) tablet, Take 1 tablet by mouth Daily. (Patient not taking: Reported on 10/5/2023), Disp: , Rfl:     cloNIDine (CATAPRES) 0.1 MG tablet, clonidine HCl 0.1 mg tablet (Patient not taking: Reported on 6/27/2023), Disp: , Rfl:     gabapentin (NEURONTIN) 100 MG capsule, gabapentin 100 mg capsule (Patient not taking: Reported on 6/27/2023), Disp: , Rfl:     HYDROcodone-acetaminophen (NORCO)  MG per tablet, Take 1 tablet by mouth  "Every 6 (Six) Hours As Needed for Moderate Pain . (Patient not taking: Reported on 6/27/2023), Disp: 15 tablet, Rfl: 0    hydrOXYzine pamoate (VISTARIL) 25 MG capsule, hydroxyzine pamoate 25 mg capsule (Patient not taking: Reported on 6/27/2023), Disp: , Rfl:     lamoTRIgine (LaMICtal) 200 MG tablet, lamotrigine 200 mg tablet  TAKE 1 TABLET BY MOUTH ONCE DAILY (Patient not taking: Reported on 6/27/2023), Disp: , Rfl:     lamoTRIgine ER 50 MG tablet sustained-release 24 hour, Take 50 mg by mouth Daily. (Patient not taking: Reported on 6/27/2023), Disp: , Rfl:     Lidocaine (ZTlido) 1.8 % patch, ZTlido 1.8 % topical patch (Patient not taking: Reported on 6/27/2023), Disp: , Rfl:     oxyCODONE (OXY-IR) 5 MG capsule, Take 5 mg by mouth 4 (Four) Times a Day As Needed for Moderate Pain  or Severe Pain . (Patient not taking: Reported on 6/27/2023), Disp: , Rfl:     Pancrelipase, Lip-Prot-Amyl, (Creon) 15947-880952 units capsule delayed-release particles capsule, Creon 36,000 unit-114,000 unit-180,000 unit capsule,delayed release (Patient not taking: Reported on 6/27/2023), Disp: , Rfl:     polyethylene glycol (MIRALAX) 17 g packet, Take 17 g by mouth Daily. Hold for diarrhea (Patient not taking: Reported on 6/27/2023), Disp: 10 each, Rfl: 0    sucralfate (CARAFATE) 1 g tablet, Take 1 g by mouth Every 4 (Four) Hours As Needed. (Patient not taking: Reported on 6/27/2023), Disp: , Rfl:     topiramate (TOPAMAX) 100 MG tablet, Take 100 mg by mouth Every Night. (Patient not taking: Reported on 6/27/2023), Disp: , Rfl:   **I did not stop or change the above medications.  Patient's medication list was updated to reflect medications they have reported as currently taking, including medication changes made by other providers.    Objective   Vital Signs:  /62   Pulse 66   Ht 167.6 cm (66\")   Wt 62.1 kg (137 lb)   SpO2 97%   BMI 22.11 kg/m²   Body mass index is 22.11 kg/m².    Physical Exam:  GENERAL: NAD, alert  HEENT: " Normocephalic, atraumatic   Resp: Even and unlabored  Extremities: No edema  Skin: Warm and dry  Psychiatric: Normal mood and affect    Neurological:   MS: AOx3, recent/remote memory intact, normal attention/concentration, language intact, no neglect  CN: visual acuity grossly normal, PERRL, EOMI, no nystagmus, no facial droop, no dysarthria, hearing symmetric, tongue midline, bilateral shoulder shrug symmetric  Motor: Normal tone.  No tremor noted.  Elbow flexors 5/5  Elbow extensors 5/5   Hip flexors 5/5  Knee extensors 5/5  Hamstring strength 5/5  Dorsiflexors 5/5  Plantar flexors 5/5  Sensory: Intact to light touch in all four ext.  Coordination: No dysmetria finger to nose   Rapid alternating movements: Normal finger to thumb tap  Gait and station: Normal gait and station    Result Review :  The following data was reviewed by: FELIPE Hudson on 10/05/2023:  Laboratory Results:         Lab Results   Component Value Date    WBC 5.99 06/29/2023    HGB 12.1 06/29/2023    HCT 39.4 06/29/2023    MCV 92.5 06/29/2023     06/29/2023     Lab Results   Component Value Date    GLUCOSE 101 (H) 10/28/2022    BUN 9 10/28/2022    CREATININE 0.79 10/28/2022    EGFRIFNONA 71 06/24/2021    BCR 11.4 10/28/2022    K 3.8 10/28/2022    CO2 21.8 (L) 10/28/2022    CALCIUM 9.7 10/28/2022    ALBUMIN 4.76 10/28/2022    LABIL2 1.4 01/29/2021    AST 18 10/28/2022    ALT 10 10/28/2022     Lab Results   Component Value Date    HGBA1C 5.8 (H) 06/29/2023     No results found for: CHOL  Lab Results   Component Value Date    HDL 64 07/06/2020    HDL 75 08/10/2018    HDL 63 04/11/2018     Lab Results   Component Value Date     (H) 07/06/2020     (H) 08/10/2018     (H) 04/11/2018     Lab Results   Component Value Date    TRIG 243 (H) 07/06/2020    TRIG 108 08/10/2018    TRIG 135 04/11/2018     No results found for: RPR  Lab Results   Component Value Date    TSH 1.600 01/29/2021     Lab Results   Component Value  Date    CFXEHJQT25 >2,000 02/02/2023       Data reviewed : Radiologic studies MRI cervical spine dated July 2023           Assessment and Plan   Diagnoses and all orders for this visit:    1. Migraine without aura and without status migrainosus, not intractable (Primary)    2. Cervical nerve root impingement  -     Cancel: Ambulatory Referral to Neurosurgery  -     Ambulatory Referral to Neurosurgery        We are going to refer Mrs. Grier to Neurosurgery for further evaluation of her C6 and C7 left-sided impingement noted on MRI of the C-spine.  She is going to continue the rizatriptan as needed for abortive migraine therapy and the Topamax for preventative treatment.  I recommended magnesium glycinate 500 mg daily and riboflavin 400 mg daily as she is interested in possibly weaning off her current medications for migraine.  We will see Fartun back in 4 months, sooner if symptoms warrant.    Call 911 for stroke symptoms    FELIPE Hudson  [facility]   10/05/23       I spent 20 minutes caring for Fartun on this date of service. This time includes time spent by me in the following activities:preparing for the visit, reviewing tests, obtaining and/or reviewing a separately obtained history, performing a medically appropriate examination and/or evaluation , counseling and educating the patient/family/caregiver, ordering medications, tests, or procedures, referring and communicating with other health care professionals , documenting information in the medical record, independently interpreting results and communicating that information with the patient/family/caregiver, and care coordination  Follow Up   Return in about 4 months (around 2/5/2024).  Patient was given instructions and counseling regarding her condition or for health maintenance advice. Please see specific information pulled into the AVS if appropriate.       Dictated using Dragon

## 2023-11-20 ENCOUNTER — TELEPHONE (OUTPATIENT)
Dept: NEUROLOGY | Facility: CLINIC | Age: 63
End: 2023-11-20
Payer: COMMERCIAL

## 2023-11-20 NOTE — TELEPHONE ENCOUNTER
Patient called stating that on 11/18 she had a seizure. Patient denied tongue biting, hitting head and no urinary incontinence. Patient did become nauseated. She states since Saturday she feels that things are moving at a slow paste.

## 2023-12-06 ENCOUNTER — OFFICE VISIT (OUTPATIENT)
Dept: NEUROLOGY | Facility: CLINIC | Age: 63
End: 2023-12-06
Payer: COMMERCIAL

## 2023-12-06 VITALS
SYSTOLIC BLOOD PRESSURE: 118 MMHG | WEIGHT: 132.4 LBS | HEART RATE: 61 BPM | BODY MASS INDEX: 21.28 KG/M2 | HEIGHT: 66 IN | DIASTOLIC BLOOD PRESSURE: 62 MMHG | RESPIRATION RATE: 16 BRPM | OXYGEN SATURATION: 100 %

## 2023-12-06 DIAGNOSIS — R56.9 WITNESSED SEIZURE-LIKE ACTIVITY: Primary | ICD-10-CM

## 2023-12-06 PROCEDURE — 99214 OFFICE O/P EST MOD 30 MIN: CPT

## 2023-12-06 RX ORDER — LEVETIRACETAM 500 MG/1
500 TABLET ORAL 2 TIMES DAILY
Qty: 60 TABLET | Refills: 3 | Status: SHIPPED | OUTPATIENT
Start: 2023-12-06
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
## 2024-01-10 DIAGNOSIS — R56.9 WITNESSED SEIZURE-LIKE ACTIVITY: ICD-10-CM

## 2024-01-10 NOTE — PROGRESS NOTES
Let patient know MRI brain looks okay, does not show cause of her seizure-like activity. Has she been scheduled for the EEG yet?

## 2024-01-22 ENCOUNTER — OFFICE VISIT (OUTPATIENT)
Dept: NEUROLOGY | Facility: CLINIC | Age: 64
End: 2024-01-22
Payer: COMMERCIAL

## 2024-01-22 VITALS
OXYGEN SATURATION: 96 % | WEIGHT: 132 LBS | RESPIRATION RATE: 20 BRPM | HEART RATE: 69 BPM | HEIGHT: 66 IN | BODY MASS INDEX: 21.21 KG/M2

## 2024-01-22 DIAGNOSIS — M54.2 CHRONIC CERVICAL PAIN: ICD-10-CM

## 2024-01-22 DIAGNOSIS — R56.9 WITNESSED SEIZURE-LIKE ACTIVITY: ICD-10-CM

## 2024-01-22 DIAGNOSIS — G43.009 MIGRAINE WITHOUT AURA AND WITHOUT STATUS MIGRAINOSUS, NOT INTRACTABLE: Primary | ICD-10-CM

## 2024-01-22 DIAGNOSIS — G89.29 CHRONIC CERVICAL PAIN: ICD-10-CM

## 2024-01-22 DIAGNOSIS — R51.9 CHRONIC DAILY HEADACHE: ICD-10-CM

## 2024-01-22 PROCEDURE — 99214 OFFICE O/P EST MOD 30 MIN: CPT

## 2024-01-22 NOTE — PROGRESS NOTES
DOS: 2024  NAME: Fartun Grier   : 1960  PCP: Johnnie Muñiz MD    Chief Complaint   Patient presents with    Witnessed seizure-like activity      SUBJECTIVE  Neurological Problem:  63 y.o. RHW female with a past medical history of migraine headaches, chronic daily headaches, chronic pancreatitis s/p Whipple procedure, recurrent abdominal pain with hospitalizations, BPPV, bipolar disorder, HLD, hypothyroidism, h/o seizure-like activity, chronic STM loss. They are seen in follow up today for seizure-like activity. Patient last seen by myself in 2023, with a summary of the history taken from the previous note with additions/modifications as indicated. She is accompanied by her .     Interval History:   **For detailed interval history see progress note dated 2023.    Mrs. Grier has been following with our clinic since 2022, previously seen by Dr. Uriostegui and last seen by myself in 2023 for h/o migraines and headaches as well as seizure-like activity.  She has been managed with topiramate 200 mg nightly for prevention and rizatriptan for rescue therapy.  She also has left-sided occipital headaches with associated neck pain and paresthesias of the left upper extremity.  A MRI C-spine completed 2023 showed severe left and moderate right foraminal narrowing with chronic bilateral C6 impingement, severe left foraminal narrowing at C6-C7 with chronic left C7 impingement and degenerative changes at other levels for which she was referred to neurosurgery.  When she was last seen in December she reported seizure-like activity that was witnessed by her .  MRI brain and EEG was ordered for further evaluation and she was started on Keppra 500 mg twice daily.    She presents today requesting to go over her MRI brain results.  She had this conducted at [facility] so I am unable to review images but the report shows no acute findings, mild chronic microvascular  "ischemic changes were seen. She was interested in completing the MRI due to a family history of glioblastoma in her father and she wanted to ensure she did not have a brain mass causing her episodes. She says that she is not interested in pursuing the rest of the workup with an EEG because these events occur years apart, her last one before November 2023 being \"1-2 years ago\".  She also says she is not taking the Keppra due to her h/o pancreatitis status post Whipple procedure and concerns of the medication's effect on her pancreas.  As for her chronic neck pain and referral to neurosurgery she says she has not followed up with them because she is not interested in having neck surgery.  She says that she has recently began seeing a chiropractor who has helped immensely with her neck pain.  She says that she has received neck manipulation with him as part of her treatment.  She says that her headaches are controlled. She continues with topiramate 200 mg nightly and utilizes the rizatriptan approximately once a week.  She says with the addition of her chiropractic therapy, the number of headaches has reduced for her.    Review of Systems   Eyes:  Positive for photophobia and visual disturbance.   Musculoskeletal:  Positive for neck pain and neck stiffness.   Neurological:  Positive for seizures and headaches.   Psychiatric/Behavioral:  Positive for sleep disturbance.         The following portions of the patient's history were reviewed and updated as appropriate: allergies, current medications, past family history, past medical history, past social history, past surgical history and problem list.    Current Medications:   Current Outpatient Medications:     albuterol sulfate  (90 Base) MCG/ACT inhaler, albuterol sulfate HFA 90 mcg/actuation aerosol inhaler, Disp: , Rfl:     Cholecalciferol (Vitamin D3) 50 MCG (2000 UT) tablet, Take 1 tablet by mouth Daily., Disp: , Rfl:     estradiol (ESTRACE) 2 MG tablet, Take " 1 tablet by mouth Daily., Disp: , Rfl:     Fluticasone-Salmeterol (ADVAIR/WIXELA) 250-50 MCG/ACT DISKUS, Inhale 1 puff., Disp: , Rfl:     HYDROmorphone (DILAUDID) 2 MG tablet, , Disp: , Rfl:     levothyroxine (SYNTHROID, LEVOTHROID) 100 MCG tablet, Take 1 tablet by mouth Daily., Disp: , Rfl:     promethazine (PHENERGAN) 25 MG tablet, Take 0.5-1 tablets by mouth Every 6 (Six) Hours As Needed for Nausea., Disp: , Rfl:     rizatriptan MLT (MAXALT-MLT) 10 MG disintegrating tablet, Place 1 tablet on the tongue 1 (One) Time As Needed for Migraine., Disp: 12 tablet, Rfl: 2    sucralfate (CARAFATE) 1 g tablet, Take 1 tablet by mouth Every 4 (Four) Hours As Needed., Disp: , Rfl:     topiramate (TOPAMAX) 200 MG tablet, Take 1 tablet by mouth Daily., Disp: , Rfl:     triamterene-hydrochlorothiazide (DYAZIDE) 37.5-25 MG per capsule, Take 1 capsule by mouth Daily As Needed., Disp: , Rfl:     vitamin B-12 (CYANOCOBALAMIN) 100 MCG tablet, Take 0.5 tablets by mouth Daily., Disp: , Rfl:     Zinc Sulfate (ZINC 15 PO), Take  by mouth., Disp: , Rfl:     Atogepant 10 MG tablet, Take 10 mg by mouth Daily. (Patient not taking: Reported on 6/27/2023), Disp: , Rfl:     brompheniramine-pseudoephedrine-DM (Bromfed DM) 30-2-10 MG/5ML syrup, Take 5 mL by mouth 4 (Four) Times a Day As Needed for Cough. (Patient not taking: Reported on 6/27/2023), Disp: 119 mL, Rfl: 0    busPIRone (BUSPAR) 15 MG tablet, buspirone 15 mg tablet (Patient not taking: Reported on 6/27/2023), Disp: , Rfl:     cephalexin (KEFLEX) 250 MG capsule, , Disp: , Rfl:     chlorhexidine (Hibiclens) 4 % external liquid, Hibiclens 4 % topical liquid  Wash back and gluteal region with Hibiclens soap the night before and morning of procedure (Patient not taking: Reported on 6/27/2023), Disp: , Rfl:     cholecalciferol (VITAMIN D3) 25 MCG (1000 UT) tablet, Take 1 tablet by mouth. (Patient not taking: Reported on 1/22/2024), Disp: , Rfl:     cloNIDine (CATAPRES) 0.1 MG tablet,  clonidine HCl 0.1 mg tablet (Patient not taking: Reported on 6/27/2023), Disp: , Rfl:     gabapentin (NEURONTIN) 100 MG capsule, gabapentin 100 mg capsule (Patient not taking: Reported on 6/27/2023), Disp: , Rfl:     HYDROcodone-acetaminophen (NORCO)  MG per tablet, Take 1 tablet by mouth Every 6 (Six) Hours As Needed for Moderate Pain . (Patient not taking: Reported on 6/27/2023), Disp: 15 tablet, Rfl: 0    hydrOXYzine pamoate (VISTARIL) 25 MG capsule, hydroxyzine pamoate 25 mg capsule (Patient not taking: Reported on 6/27/2023), Disp: , Rfl:     lamoTRIgine (LaMICtal) 200 MG tablet, lamotrigine 200 mg tablet  TAKE 1 TABLET BY MOUTH ONCE DAILY (Patient not taking: Reported on 6/27/2023), Disp: , Rfl:     lamoTRIgine ER 50 MG tablet sustained-release 24 hour, Take 50 mg by mouth Daily. (Patient not taking: Reported on 6/27/2023), Disp: , Rfl:     levETIRAcetam (KEPPRA) 500 MG tablet, Take 1 tablet by mouth 2 (Two) Times a Day. (Patient not taking: Reported on 1/22/2024), Disp: 60 tablet, Rfl: 3    Lidocaine (ZTlido) 1.8 % patch, ZTlido 1.8 % topical patch (Patient not taking: Reported on 6/27/2023), Disp: , Rfl:     oxyCODONE (OXY-IR) 5 MG capsule, Take 5 mg by mouth 4 (Four) Times a Day As Needed for Moderate Pain  or Severe Pain . (Patient not taking: Reported on 6/27/2023), Disp: , Rfl:     Pancrelipase, Lip-Prot-Amyl, (Creon) 29132-145841 units capsule delayed-release particles capsule, Creon 36,000 unit-114,000 unit-180,000 unit capsule,delayed release (Patient not taking: Reported on 6/27/2023), Disp: , Rfl:     polyethylene glycol (MIRALAX) 17 g packet, Take 17 g by mouth Daily. Hold for diarrhea (Patient not taking: Reported on 6/27/2023), Disp: 10 each, Rfl: 0    promethazine (PHENERGAN) 12.5 MG suppository, Insert 1 suppository into the rectum Every 6 (Six) Hours As Needed for Nausea. (Patient not taking: Reported on 1/22/2024), Disp: , Rfl:     topiramate (TOPAMAX) 100 MG tablet, Take 100 mg by  "mouth Every Night., Disp: , Rfl:   **I did not stop or change the above medications.  Patient's medication list was updated to reflect medications they have reported as currently taking, including medication changes made by other providers.    Objective   Vital Signs:  Pulse 69   Resp 20   Ht 167.6 cm (65.98\")   Wt 59.9 kg (132 lb)   SpO2 96%   BMI 21.32 kg/m²   Body mass index is 21.32 kg/m².    Physical Exam   Physical Exam:  GENERAL: NAD, alert  HEENT: Normocephalic, atraumatic   Resp: Even and unlabored  Psychiatric: Normal mood and affect    Neurological:   MS: AOx3, recent/remote memory intact, normal attention/concentration, language intact, no neglect  CN: visual acuity grossly normal, PERRL, EOMI, no facial droop, no dysarthria, tongue midline  Motor: Moves all four extremities equally and symmetrically, independent of gravity. Normal tone. No tremor or abnormal movements noted.   Sensory: Intact to light touch in all four ext.  Gait and station: Normal gait and station    Result Review :  The following data was reviewed by: FELIPE Hudson on 01/22/2024:  Laboratory Results:         Lab Results   Component Value Date    WBC 5.99 06/29/2023    HGB 12.1 06/29/2023    HCT 39.4 06/29/2023    MCV 92.5 06/29/2023     06/29/2023     Lab Results   Component Value Date    GLUCOSE 101 (H) 10/28/2022    BUN 5 (L) 02/02/2023    CREATININE 0.69 02/02/2023    EGFRIFNONA 71 06/24/2021    EGFRIFAFRI >60 02/02/2023    BCR 7.1 02/02/2023    K 4.5 02/02/2023    CO2 25 02/02/2023    CALCIUM 9.8 02/02/2023    ALBUMIN 4.4 02/02/2023    LABIL2 1.3 02/02/2023    AST 27 02/02/2023    ALT 10 02/02/2023     Lab Results   Component Value Date    HGBA1C 5.8 (H) 06/29/2023     No results found for: \"CHOL\"  Lab Results   Component Value Date    HDL 64 07/06/2020    HDL 75 08/10/2018    HDL 63 04/11/2018     Lab Results   Component Value Date     (H) 07/06/2020     (H) 08/10/2018     (H) 04/11/2018 " "    Lab Results   Component Value Date    TRIG 243 (H) 07/06/2020    TRIG 108 08/10/2018    TRIG 135 04/11/2018     No results found for: \"RPR\"  Lab Results   Component Value Date    TSH 1.600 01/29/2021     Lab Results   Component Value Date    [id number] >2,000 02/02/2023       Data reviewed : Radiologic studies           Assessment and Plan   Diagnoses and all orders for this visit:    1. Migraine without aura and without status migrainosus, not intractable (Primary)    2. Chronic daily headache    3. Chronic cervical pain    4. Witnessed seizure-like activity      We will continue with her topiramate for migraine prevention and rizatriptan as needed for abortive therapy. Fortunately this medication also has anti-seizure benefits. There are some post-marketing reports about levetiracetam related to pancreatitis including acute pancreatitis so there is some validity to her concerns of taking the medication. I have cautioned her against neck manipulation due to concerns/reports of possible dissection of the great vessels of her neck and posterior head.  She would like to visit with neurosurgery should the chiropractic therapy no longer be helpful to her. I have also discussed the importance of her taking the Keppra or any antiepileptic to prevent further seizure-like episodes as well as reporting any further episodes and being evaluated in the ED. We did again discuss no driving in [place] for the 90-day period; her episode occurred 11/18/23 so she should continue to not drive until 2/18/24. We discussed that in [place] that period of time may be longer. She states understanding but declines the medication and further workup with prolonged EEG monitoring.    We will see Fartun back in 6 months, sooner if symptoms warrant.     FELIPE Hudson  [facility]   01/23/24     I spent 30 minutes caring for Fartun on this date of service. This time includes time spent by me in the following activities:preparing for " the visit, reviewing tests, obtaining and/or reviewing a separately obtained history, performing a medically appropriate examination and/or evaluation , counseling and educating the patient/family/caregiver, referring and communicating with other health care professionals , documenting information in the medical record, independently interpreting results and communicating that information with the patient/family/caregiver, and care coordination  Follow Up   Return in about 6 months (around 7/22/2024).  Patient was given instructions and counseling regarding her condition or for health maintenance advice. Please see specific information pulled into the AVS if appropriate.     Dictated using Dragon Dictation

## 2024-03-18 ENCOUNTER — HOSPITAL ENCOUNTER (OUTPATIENT)
Dept: GENERAL RADIOLOGY | Facility: HOSPITAL | Age: 64
Discharge: HOME | End: 2024-03-18

## 2024-03-18 ENCOUNTER — TRANSCRIBE ORDERS (OUTPATIENT)
Dept: ADMINISTRATIVE | Facility: HOSPITAL | Age: 64
End: 2024-03-18
Payer: COMMERCIAL

## 2024-03-18 DIAGNOSIS — Z02.71 ENCOUNTER FOR DISABILITY DETERMINATION: ICD-10-CM

## 2024-03-18 DIAGNOSIS — Z02.71 ENCOUNTER FOR DISABILITY DETERMINATION: Primary | ICD-10-CM

## 2024-03-18 PROCEDURE — 73100 X-RAY EXAM OF WRIST: CPT

## 2024-03-18 PROCEDURE — 73030 X-RAY EXAM OF SHOULDER: CPT

## 2024-03-18 PROCEDURE — 72040 X-RAY EXAM NECK SPINE 2-3 VW: CPT

## 2024-03-18 PROCEDURE — 72100 X-RAY EXAM L-S SPINE 2/3 VWS: CPT

## 2024-04-19 ENCOUNTER — TELEPHONE (OUTPATIENT)
Dept: NEUROLOGY | Facility: CLINIC | Age: 64
End: 2024-04-19
Payer: COMMERCIAL

## 2024-04-19 DIAGNOSIS — M54.12 CERVICAL RADICULOPATHY: Primary | ICD-10-CM

## 2024-04-19 NOTE — TELEPHONE ENCOUNTER
PATIENT STATES SHE IS NOW READY FOR REFERRAL TO NEUROSURGERY.  PLEASE LET HER KNOW WHEN REFERRAL IS SENT.    ALSO, PATIENT IS REQUESTING A COPY OF THE WRITTEN REPORT FROM HER LATEST MRI BE MAILED TO HER HOME ADDRESS.    THANK YOU

## 2024-04-19 NOTE — TELEPHONE ENCOUNTER
LVM letting patient know that her referral to neurosurgery has been placed and that she needs to reach out to [facility] for the medical records that she's requesting. I gave her the number. If she calls back and needs the number to neurosurgery, please give her the number or transfer her to their office, please.

## 2024-07-22 ENCOUNTER — OFFICE VISIT (OUTPATIENT)
Dept: NEUROLOGY | Facility: CLINIC | Age: 64
End: 2024-07-22
Payer: COMMERCIAL

## 2024-07-22 VITALS
HEIGHT: 66 IN | HEART RATE: 62 BPM | RESPIRATION RATE: 20 BRPM | SYSTOLIC BLOOD PRESSURE: 112 MMHG | OXYGEN SATURATION: 98 % | DIASTOLIC BLOOD PRESSURE: 72 MMHG | WEIGHT: 134 LBS | BODY MASS INDEX: 21.53 KG/M2

## 2024-07-22 DIAGNOSIS — M54.2 CHRONIC CERVICAL PAIN: ICD-10-CM

## 2024-07-22 DIAGNOSIS — G44.86 CERVICOGENIC HEADACHE: ICD-10-CM

## 2024-07-22 DIAGNOSIS — M54.12 CERVICAL RADICULOPATHY: ICD-10-CM

## 2024-07-22 DIAGNOSIS — G43.009 MIGRAINE WITHOUT AURA AND WITHOUT STATUS MIGRAINOSUS, NOT INTRACTABLE: Primary | ICD-10-CM

## 2024-07-22 DIAGNOSIS — G89.29 CHRONIC CERVICAL PAIN: ICD-10-CM

## 2024-07-22 DIAGNOSIS — R56.9 WITNESSED SEIZURE-LIKE ACTIVITY: ICD-10-CM

## 2024-07-22 DIAGNOSIS — H81.10 BENIGN PAROXYSMAL POSITIONAL VERTIGO, UNSPECIFIED LATERALITY: ICD-10-CM

## 2024-07-22 PROCEDURE — 99214 OFFICE O/P EST MOD 30 MIN: CPT

## 2024-07-22 RX ORDER — MAGNESIUM OXIDE 400 MG/1
400 TABLET ORAL DAILY
COMMUNITY

## 2024-07-22 RX ORDER — NALOXONE HYDROCHLORIDE 4 MG/.1ML
SPRAY NASAL
COMMUNITY
Start: 2024-06-19

## 2024-07-22 RX ORDER — FENTANYL 25 UG/1
1 PATCH TRANSDERMAL
COMMUNITY

## 2024-07-22 NOTE — PROGRESS NOTES
"DOS: 2024  NAME: Fartun Grier   : 1960  PCP: Johnnie Muñiz MD    Chief Complaint   Patient presents with    Migraine without aura and without status migrainosus, not i      SUBJECTIVE  Neurological Problem:  64 y.o. RHW female with a past medical history of migraine headaches, chronic daily headaches, chronic pancreatitis s/p Whipple, BPPV, bipolar disorder, hypothyroidism, hyperlipidemia. They are seen in follow up today for migraines, headaches, neck pain, seizure-like activity. A summary of the history was taken from the previous note with additions/modifications as indicated. She is accompanied by her spouse.    Interval History:   **For detailed interval history see progress note dated 2023.    Mrs. Grier has been following with our clinic since 2022, previously seen by Dr. Uriostegui and more recently by me in 2024 for h/o migraines and headaches as well as seizure-like activity.  She has been managed with topiramate 200 mg nightly for prevention and rizatriptan for rescue therapy.  She also has left-sided occipital headaches with associated cervical radiculopathy for which she was referred to neurosurgery.  When she was last seen in January she reported not taking Keppra due to concerns of it affecting her pancreas and did not pursue EEG testing due to her events occurring sporadically (years apart).     She presents today in follow-up and reports that she is experiencing around 3 headaches a week that seem to stem from her neck and radiate to the left side of her head. She is scheduled to see Neurosurgery tomorrow for evaluation and discussion of her MRI Cspine. She still sees her chiropractor and feels their treatment helps decrease this type of headache. She continues topiramate 200 mg nightly and is tolerating it well. She utilizes the rizatriptan 10 mg as needed but dislikes it as it causes a \"hungover\" feeling and she is tired for the rest of the day after taking " it. She says she has difficulty determining if she is experiencing a migraine or a cervicogenic headache but can tell once taking the rizatriptan if it gets rid of the headache. She does experience a migraine prodrome and postdrome where she is fatigued and feels hungover, has brain fog and difficulty getting her words out, also blurred vision. This all resolves on its own after the headache passes up to the following day.     She says she has not experienced any further seizure-like activity. She mentioned the events to her PCP who reinforced no driving after seizure events. She is not taking the Keppra but again, is on topiramate which is an anti-epileptic. She denies any falls, no development of a tremor. She mentions a history of BPPV, says she has been experiencing intermittent episodes with associated aural fullness in the left ear. She has gone through vestibular therapy in the past and knows how to perform Epley maneuver on herself but typically holds off until her symptoms are more severe. She denies any focal or unilateral symptoms such as diplopia or vision loss, facial droop or slurred speech, weakness or numbness to one side of her body.    Review of Systems   Musculoskeletal:  Positive for gait problem.   Neurological:  Positive for dizziness, seizures (not since November) and headaches. Negative for tremors, syncope, facial asymmetry, speech difficulty, weakness, light-headedness and numbness.   Psychiatric/Behavioral:  Negative for agitation, behavioral problems, confusion, decreased concentration, dysphoric mood, hallucinations, self-injury, sleep disturbance and suicidal ideas. The patient is not nervous/anxious and is not hyperactive.         The following portions of the patient's history were reviewed and updated as appropriate: allergies, current medications, past family history, past medical history, past social history, past surgical history and problem list.    Current Medications:   Current  Outpatient Medications:     albuterol sulfate  (90 Base) MCG/ACT inhaler, albuterol sulfate HFA 90 mcg/actuation aerosol inhaler, Disp: , Rfl:     Cholecalciferol (Vitamin D3) 50 MCG (2000 UT) tablet, Take 1 tablet by mouth Daily., Disp: , Rfl:     estradiol (ESTRACE) 2 MG tablet, Take 1 tablet by mouth Daily., Disp: , Rfl:     fentaNYL (DURAGESIC) 25 MCG/HR patch, Place 1 patch on the skin as directed by provider Every 72 (Seventy-Two) Hours., Disp: , Rfl:     Fluticasone-Salmeterol (ADVAIR/WIXELA) 250-50 MCG/ACT DISKUS, Inhale 1 puff., Disp: , Rfl:     HYDROmorphone (DILAUDID) 2 MG tablet, , Disp: , Rfl:     levothyroxine (SYNTHROID, LEVOTHROID) 100 MCG tablet, Take 1 tablet by mouth Daily., Disp: , Rfl:     Narcan 4 MG/0.1ML nasal spray, INSTILL 1 SQUIRT INTO THE NOSTRILS AS DIRECTED AS NEEDED; USE ONLY FOR OPIOD OVERDOSE AND CALL 911, Disp: , Rfl:     promethazine (PHENERGAN) 25 MG tablet, Take 0.5-1 tablets by mouth Every 6 (Six) Hours As Needed for Nausea., Disp: , Rfl:     rizatriptan MLT (MAXALT-MLT) 10 MG disintegrating tablet, Place 1 tablet on the tongue 1 (One) Time As Needed for Migraine., Disp: 12 tablet, Rfl: 2    topiramate (TOPAMAX) 200 MG tablet, Take 1 tablet by mouth Daily., Disp: , Rfl:     triamterene-hydrochlorothiazide (DYAZIDE) 37.5-25 MG per capsule, Take 1 capsule by mouth Daily As Needed., Disp: , Rfl:     vitamin B-12 (CYANOCOBALAMIN) 100 MCG tablet, Take 0.5 tablets by mouth Daily., Disp: , Rfl:     Zinc Sulfate (ZINC 15 PO), Take  by mouth., Disp: , Rfl:     Atogepant 10 MG tablet, Take 10 mg by mouth Daily. (Patient not taking: Reported on 6/27/2023), Disp: , Rfl:     brompheniramine-pseudoephedrine-DM (Bromfed DM) 30-2-10 MG/5ML syrup, Take 5 mL by mouth 4 (Four) Times a Day As Needed for Cough. (Patient not taking: Reported on 6/27/2023), Disp: 119 mL, Rfl: 0    busPIRone (BUSPAR) 15 MG tablet, buspirone 15 mg tablet (Patient not taking: Reported on 6/27/2023), Disp: , Rfl:      cephalexin (KEFLEX) 250 MG capsule, , Disp: , Rfl:     chlorhexidine (Hibiclens) 4 % external liquid, Hibiclens 4 % topical liquid  Wash back and gluteal region with Hibiclens soap the night before and morning of procedure (Patient not taking: Reported on 6/27/2023), Disp: , Rfl:     cholecalciferol (VITAMIN D3) 25 MCG (1000 UT) tablet, Take 1 tablet by mouth. (Patient not taking: Reported on 1/22/2024), Disp: , Rfl:     cloNIDine (CATAPRES) 0.1 MG tablet, clonidine HCl 0.1 mg tablet (Patient not taking: Reported on 6/27/2023), Disp: , Rfl:     gabapentin (NEURONTIN) 100 MG capsule, gabapentin 100 mg capsule (Patient not taking: Reported on 6/27/2023), Disp: , Rfl:     HYDROcodone-acetaminophen (NORCO)  MG per tablet, Take 1 tablet by mouth Every 6 (Six) Hours As Needed for Moderate Pain . (Patient not taking: Reported on 6/27/2023), Disp: 15 tablet, Rfl: 0    hydrOXYzine pamoate (VISTARIL) 25 MG capsule, hydroxyzine pamoate 25 mg capsule (Patient not taking: Reported on 6/27/2023), Disp: , Rfl:     lamoTRIgine (LaMICtal) 200 MG tablet, lamotrigine 200 mg tablet  TAKE 1 TABLET BY MOUTH ONCE DAILY (Patient not taking: Reported on 6/27/2023), Disp: , Rfl:     lamoTRIgine ER 50 MG tablet sustained-release 24 hour, Take 50 mg by mouth Daily. (Patient not taking: Reported on 6/27/2023), Disp: , Rfl:     levETIRAcetam (KEPPRA) 500 MG tablet, Take 1 tablet by mouth 2 (Two) Times a Day. (Patient not taking: Reported on 1/22/2024), Disp: 60 tablet, Rfl: 3    Lidocaine (ZTlido) 1.8 % patch, ZTlido 1.8 % topical patch (Patient not taking: Reported on 6/27/2023), Disp: , Rfl:     magnesium oxide (MAG-OX) 400 MG tablet, Take 1 tablet by mouth Daily., Disp: , Rfl:     oxyCODONE (OXY-IR) 5 MG capsule, Take 5 mg by mouth 4 (Four) Times a Day As Needed for Moderate Pain  or Severe Pain . (Patient not taking: Reported on 6/27/2023), Disp: , Rfl:     Pancrelipase, Lip-Prot-Amyl, (Creon) 38368-340695 units capsule  "delayed-release particles capsule, Creon 36,000 unit-114,000 unit-180,000 unit capsule,delayed release (Patient not taking: Reported on 6/27/2023), Disp: , Rfl:     polyethylene glycol (MIRALAX) 17 g packet, Take 17 g by mouth Daily. Hold for diarrhea (Patient not taking: Reported on 6/27/2023), Disp: 10 each, Rfl: 0    Rimegepant Sulfate (NURTEC) 75 MG tablet dispersible tablet, Take 1 tablet by mouth Daily As Needed (migraine headache)., Disp: 4 tablet, Rfl: 0    sucralfate (CARAFATE) 1 g tablet, Take 1 tablet by mouth Every 4 (Four) Hours As Needed. (Patient not taking: Reported on 7/22/2024), Disp: , Rfl:   **I did not stop or change the above medications.  Patient's medication list was updated to reflect medications they have reported as currently taking, including medication changes made by other providers.    Objective   Vital Signs:  /72   Pulse 62   Resp 20   Ht 167.6 cm (65.98\")   Wt 60.8 kg (134 lb)   SpO2 98%   BMI 21.64 kg/m²   Body mass index is 21.64 kg/m².    Physical Exam   Physical Exam:  GENERAL: NAD, alert  HEENT: Normocephalic, atraumatic   Resp: Even and unlabored  Extremities: No edema  Skin: Warm and dry  Psychiatric: Normal mood and affect    Neurological:   MS: AOx3, recent/remote memory intact, normal attention/concentration, language intact, no neglect.   CN: visual acuity grossly normal, PERRL, EOMI, no nystagmus, no facial droop, no dysarthria, hearing symmetric, tongue midline, bilateral shoulder shrug symmetric  Motor: Normal tone and bulk. No tremor or abnormal movements noted. No asterixis. No overshoot.    Trapezius elevation: 5/5  Shoulder abductors 5/5  Elbow flexors 5/5  Elbow extensors 5/5   Left  2+  Right  2+  Hip flexors 5/5  Knee extensors 5/5  Hamstring strength 5/5  Dorsiflexors 5/5  Plantar flexors 5/5  Sensory: Intact to crude and light touch in all four ext.  Coordination: No dysmetria finger to nose   Rapid alternating movements: Normal finger to " "thumb tap  Gait and station: Normal gait and station    Result Review :  The following data was reviewed by: FELIPE Hudson on 07/22/2024:  Laboratory Results:         Lab Results   Component Value Date    WBC 5.99 07/17/2024    HGB 13.2 07/17/2024    HCT 40.5 07/17/2024    MCV 89.8 07/17/2024     07/17/2024     Lab Results   Component Value Date    GLUCOSE 101 (H) 10/28/2022    BUN 5 (L) 02/02/2023    CREATININE 0.69 02/02/2023    EGFRIFNONA 71 06/24/2021    EGFRIFAFRI >60 02/02/2023    BCR 7.1 02/02/2023    K 4.5 02/02/2023    CO2 25 02/02/2023    CALCIUM 9.8 02/02/2023    ALBUMIN 4.4 02/02/2023    LABIL2 1.3 02/02/2023    AST 27 02/02/2023    ALT 10 02/02/2023     Lab Results   Component Value Date    HGBA1C 5.6 07/17/2024     No results found for: \"CHOL\"  Lab Results   Component Value Date    HDL 64 07/06/2020    HDL 75 08/10/2018    HDL 63 04/11/2018     Lab Results   Component Value Date     (H) 07/06/2020     (H) 08/10/2018     (H) 04/11/2018     Lab Results   Component Value Date    TRIG 243 (H) 07/06/2020    TRIG 108 08/10/2018    TRIG 135 04/11/2018     No results found for: \"RPR\"  Lab Results   Component Value Date    TSH 1.600 01/29/2021     Lab Results   Component Value Date    [id number] >2,000 07/17/2024                  Assessment and Plan   Diagnoses and all orders for this visit:    1. Migraine without aura and without status migrainosus, not intractable (Primary)  -     Rimegepant Sulfate (NURTEC) 75 MG tablet dispersible tablet; Take 1 tablet by mouth Daily As Needed (migraine headache).  Dispense: 4 tablet; Refill: 0    2. Cervicogenic headache    3. Chronic cervical pain    4. Cervical radiculopathy    5. Witnessed seizure-like activity    6. Benign paroxysmal positional vertigo, unspecified laterality      We will add Nurtec 75 mg PRN to utilize in addition to the rizatriptan for rescue. I counseled her on not using both medications on the same day. She will " let us know if it is helpful for her and we will send in a prescription. She will continue the topiramate 200 mg nightly. For her BPPV, she knows how to do Epley maneuvers to stop her symptoms.     We will see Fartun back in 6 months, sooner if symptoms warrant.     FELIPE Hudson  [facility]   07/22/24       Follow Up   Return in about 6 months (around 1/22/2025).  Patient was given instructions and counseling regarding her condition or for health maintenance advice. Please see specific information pulled into the AVS if appropriate.       Dictated using Dragon Dictation

## 2024-07-22 NOTE — PROGRESS NOTES
"Subjective   Patient ID: Fartun Grier is a 64 y.o. female is being seen for consultation today at the request of FELIPE Hudson for cervical radiculopathy     Treatment: No recent treatment     Today patient states that she has neck pain that radiates to the her head and to her L shoulder, along with intermittent tingling in the L arm     History of Present Illness    This patient has been having pain in the left side of her neck with radiation up in the left side of her head for well over a year.  At times the pain is excruciating at times and is not as bad.  She really does not have much in the way of radiation down her arms.  She has no other associated symptoms either.  She has had very little treatment for this other than some chiropractic which she started recently and it seems to be helping.    The following portions of the patient's history were reviewed and updated as appropriate: allergies, current medications, past family history, past medical history, past social history, past surgical history, and problem list.    Review of Systems   Constitutional:  Negative for chills and fever.   HENT:  Negative for congestion.    Musculoskeletal:  Positive for myalgias, neck pain and neck stiffness.   Neurological:  Positive for headaches. Negative for weakness and numbness.       I reviewed the review of systems listed by the patient and discussed by my MA    Objective     Vitals:    07/23/24 1546   BP: 110/70   Cuff Size: Adult   Pulse: 63   SpO2: 99%   Weight: 60.8 kg (134 lb)   Height: 167.6 cm (65.98\")     Body mass index is 21.64 kg/m².    Tobacco Use: Low Risk  (7/23/2024)    Patient History     Smoking Tobacco Use: Never     Smokeless Tobacco Use: Never     Passive Exposure: Never          Physical Exam  Constitutional:       Appearance: She is well-developed.   HENT:      Head: Normocephalic and atraumatic.   Eyes:      Extraocular Movements: EOM normal.      Conjunctiva/sclera: Conjunctivae " normal.      Pupils: Pupils are equal, round, and reactive to light.   Neck:      Vascular: No carotid bruit.   Neurological:      Mental Status: She is oriented to person, place, and time.      Coordination: Finger-Nose-Finger Test and Heel to Shin Test normal.      Gait: Gait is intact.      Deep Tendon Reflexes:      Reflex Scores:       Tricep reflexes are 2+ on the right side and 2+ on the left side.       Bicep reflexes are 2+ on the right side and 2+ on the left side.       Brachioradialis reflexes are 2+ on the right side and 2+ on the left side.       Patellar reflexes are 2+ on the right side and 2+ on the left side.       Achilles reflexes are 2+ on the right side and 2+ on the left side.  Psychiatric:         Speech: Speech normal.       Neurologic Exam     Mental Status   Oriented to person, place, and time.   Registration of memory: Good recent and remote memory.   Attention: normal. Concentration: normal.   Speech: speech is normal   Level of consciousness: alert  Knowledge: consistent with education.     Cranial Nerves     CN II   Visual fields full to confrontation.   Visual acuity: normal    CN III, IV, VI   Pupils are equal, round, and reactive to light.  Extraocular motions are normal.     CN V   Facial sensation intact.   Right corneal reflex: normal  Left corneal reflex: normal    CN VII   Facial expression full, symmetric.   Right facial weakness: none  Left facial weakness: none    CN VIII   Hearing: intact    CN IX, X   Palate: symmetric    CN XI   Right sternocleidomastoid strength: normal  Left sternocleidomastoid strength: normal    CN XII   Tongue: not atrophic  Tongue deviation: none    Motor Exam   Muscle bulk: normal  Right arm tone: normal  Left arm tone: normal  Right leg tone: normal  Left leg tone: normal    Strength   Strength 5/5 except as noted.     Sensory Exam   Light touch normal.     Gait, Coordination, and Reflexes     Gait  Gait: normal    Coordination   Finger to nose  coordination: normal  Heel to shin coordination: normal    Reflexes   Right brachioradialis: 2+  Left brachioradialis: 2+  Right biceps: 2+  Left biceps: 2+  Right triceps: 2+  Left triceps: 2+  Right patellar: 2+  Left patellar: 2+  Right achilles: 2+  Left achilles: 2+  Right : 2+  Left : 2+          Assessment & Plan   Independent Review of Radiographic Studies:      I personally reviewed the images from the following studies.    I reviewed an MRI of her cervical spine done on July 27 of last year.  This shows some narrowing at several levels on the sagittal image.  On the axial images C2-3 and C3-4 look pretty good.  C4-5 shows a midline disc herniation that is distorting the cord.  C5-6 shows some spondylitic spurring and a disc of the right side causing some flattening of the cord and foraminal stenosis.  C6-7 shows me some narrowing on the left side causing foraminal stenosis and C7-T1 mostly looks okay.    Medical Decision Making:      I told the patient that there is nothing alarming on the MRI.  And frankly based on her current symptoms I would not recommend any surgery for any of the abnormalities that we see.  Since the chiropractic seems to be working I suggest that she keep up with that for another month or so.  I think her primary problem is some arthritis in the joints in her neck and if that is not helping to the point where she can stand the pain in another month then she should let me know and we will get her into see a pain management doctor.  She agrees with that plan.    Diagnoses and all orders for this visit:    1. Neck pain (Primary)      Return if symptoms worsen or fail to improve.

## 2024-07-23 ENCOUNTER — OFFICE VISIT (OUTPATIENT)
Dept: NEUROSURGERY | Facility: CLINIC | Age: 64
End: 2024-07-23
Payer: COMMERCIAL

## 2024-07-23 VITALS
SYSTOLIC BLOOD PRESSURE: 110 MMHG | OXYGEN SATURATION: 99 % | HEIGHT: 66 IN | WEIGHT: 134 LBS | DIASTOLIC BLOOD PRESSURE: 70 MMHG | BODY MASS INDEX: 21.53 KG/M2 | HEART RATE: 63 BPM

## 2024-07-23 DIAGNOSIS — M54.2 NECK PAIN: Primary | ICD-10-CM

## 2024-08-27 ENCOUNTER — PATIENT MESSAGE (OUTPATIENT)
Dept: NEUROLOGY | Facility: CLINIC | Age: 64
End: 2024-08-27
Payer: COMMERCIAL

## 2024-08-27 ENCOUNTER — TELEPHONE (OUTPATIENT)
Dept: NEUROLOGY | Facility: CLINIC | Age: 64
End: 2024-08-27
Payer: COMMERCIAL

## 2024-08-27 DIAGNOSIS — G43.009 MIGRAINE WITHOUT AURA AND WITHOUT STATUS MIGRAINOSUS, NOT INTRACTABLE: Primary | ICD-10-CM

## 2024-08-27 NOTE — TELEPHONE ENCOUNTER
Caller: Fartun Grier   Best call back number:   Telephone Information:   Mobile [phone]     What medication are you requesting: Rimegepant Sulfate (NURTEC) 75 MG tablet dispersible tablet     What are your current symptoms: MIGRAINES      Have you had these symptoms before:    [x] Yes  [] No    Have you been treated for these symptoms before:   [x] Yes  [] No    If a prescription is needed, what is your preferred pharmacy and phone number:    [id number] [place] [address] [address] [facility] [phone] [phone] FX       Additional notes: PT STATES SHE DID LIKE THE NURTEC SAMPLES AND REQUEST A PRESCRIPTION BE SENT TO THE PHARMACY ABOVE

## 2025-01-22 ENCOUNTER — OFFICE VISIT (OUTPATIENT)
Dept: NEUROLOGY | Facility: CLINIC | Age: 65
End: 2025-01-22
Payer: MEDICARE

## 2025-01-22 VITALS
SYSTOLIC BLOOD PRESSURE: 108 MMHG | DIASTOLIC BLOOD PRESSURE: 72 MMHG | RESPIRATION RATE: 20 BRPM | HEART RATE: 75 BPM | WEIGHT: 124 LBS | HEIGHT: 66 IN | BODY MASS INDEX: 19.93 KG/M2 | OXYGEN SATURATION: 94 %

## 2025-01-22 DIAGNOSIS — H81.10 BENIGN PAROXYSMAL POSITIONAL VERTIGO, UNSPECIFIED LATERALITY: ICD-10-CM

## 2025-01-22 DIAGNOSIS — G43.009 MIGRAINE WITHOUT AURA AND WITHOUT STATUS MIGRAINOSUS, NOT INTRACTABLE: Primary | ICD-10-CM

## 2025-01-22 PROBLEM — Q45.3 PANCREAS DIVISUM: Status: ACTIVE | Noted: 2023-10-16

## 2025-01-22 RX ORDER — TOPIRAMATE 200 MG/1
200 TABLET, FILM COATED ORAL DAILY
Qty: 90 TABLET | Refills: 1 | Status: SHIPPED | OUTPATIENT
Start: 2025-01-22

## 2025-01-22 RX ORDER — RIZATRIPTAN BENZOATE 10 MG/1
10 TABLET, ORALLY DISINTEGRATING ORAL ONCE AS NEEDED
Qty: 12 TABLET | Refills: 5 | Status: SHIPPED | OUTPATIENT
Start: 2025-01-22

## 2025-01-22 RX ORDER — METHADONE HYDROCHLORIDE 5 MG/1
1 TABLET ORAL EVERY 12 HOURS SCHEDULED
COMMUNITY
Start: 2024-12-30

## 2025-01-22 NOTE — PROGRESS NOTES
"DOS: 2025  NAME: Fartun Grier   : 1960  PCP: Johnnie Muñiz MD    Chief Complaint   Patient presents with    Migraine without aura and without status migrainosus, not i      SUBJECTIVE  Neurological Problem:  64 y.o. right-handed female with a past medical history of migraine headaches, chronic daily headaches, chronic pancreatitis s/p Whipple, BPPV, bipolar disorder, hypothyroidism, hyperlipidemia. They are seen in follow up today for migraine headaches. A summary of the history was taken from the previous note with additions/modifications as indicated. She is accompanied by her spouse.    Interval History:   **For detailed interval history see progress note dated 2023.     Mrs. Grier has been following with our clinic since 2022, previously seen by Dr. Uriostegui  for h/o migraines and headaches as well as seizure-like activity.  She has been managed with topiramate 200 mg nightly for migraine prevention and rizatriptan for rescue therapy.  She also has left-sided occipital headaches with associated cervical radiculopathy for which she was referred to neurosurgery.  For her seizure activity, she denied further events at her last visit in 2024, was not compliant with antiepileptic medications due to concerns that it would affect with her pancreas.    She presents today in follow-up and reports that she feels her headaches are well-controlled.  She tells me that she is no longer working, is on disability and feels that this has significantly helped in her stress reduction.  She.  Try the Nurtec samples I gave her at her last visit and felt they were very helpful in migraine rescue; she likes to take them because they do not cause a \"hung over\" feeling like the rizatriptan does however she said she was unable to  the prescription.  Her spouse will also apply pressure points that can help eliminate her headaches.  As for her dizziness she says that has essentially resolved, she " has not experienced many episodes since her last visit.    Review of Systems   Musculoskeletal:  Negative for gait problem.   Neurological:  Negative for dizziness, tremors, seizures, syncope, facial asymmetry, speech difficulty, weakness, light-headedness, numbness and headaches.   Psychiatric/Behavioral:  Negative for agitation, behavioral problems, confusion, decreased concentration, dysphoric mood, hallucinations, self-injury, sleep disturbance and suicidal ideas. The patient is not nervous/anxious and is not hyperactive.         The following portions of the patient's history were reviewed and updated as appropriate: allergies, current medications, past family history, past medical history, past social history, past surgical history and problem list.    Current Medications:   Current Outpatient Medications:     albuterol sulfate  (90 Base) MCG/ACT inhaler, albuterol sulfate HFA 90 mcg/actuation aerosol inhaler, Disp: , Rfl:     cholecalciferol (VITAMIN D3) 25 MCG (1000 UT) tablet, Take 1 tablet by mouth., Disp: , Rfl:     cloNIDine (CATAPRES) 0.1 MG tablet, , Disp: , Rfl:     estradiol (ESTRACE) 2 MG tablet, Take 1 tablet by mouth Daily., Disp: , Rfl:     fentaNYL (DURAGESIC) 25 MCG/HR patch, Place 1 patch on the skin as directed by provider Every 72 (Seventy-Two) Hours., Disp: , Rfl:     levothyroxine (SYNTHROID, LEVOTHROID) 100 MCG tablet, Take 1 tablet by mouth Daily., Disp: , Rfl:     methadone (DOLOPHINE) 5 MG tablet, Take 1 tablet by mouth Every 12 (Twelve) Hours., Disp: , Rfl:     Narcan 4 MG/0.1ML nasal spray, INSTILL 1 SQUIRT INTO THE NOSTRILS AS DIRECTED AS NEEDED; USE ONLY FOR OPIOD OVERDOSE AND CALL 911, Disp: , Rfl:     oxyCODONE (OXY-IR) 5 MG capsule, Take 1 capsule by mouth 4 (Four) Times a Day As Needed for Moderate Pain or Severe Pain., Disp: , Rfl:     promethazine (PHENERGAN) 25 MG tablet, Take 0.5-1 tablets by mouth Every 6 (Six) Hours As Needed for Nausea., Disp: , Rfl:      rimegepant sulfate (NURTEC) 75 MG tablet, Take 1 tablet by mouth Daily As Needed (for migraine headache). Do not exceed more than one tablet in a 24 hour period., Disp: 16 tablet, Rfl: 5    rizatriptan MLT (MAXALT-MLT) 10 MG disintegrating tablet, Place 1 tablet on the tongue 1 (One) Time As Needed for Migraine., Disp: 12 tablet, Rfl: 5    topiramate (TOPAMAX) 200 MG tablet, Take 1 tablet by mouth Daily., Disp: 90 tablet, Rfl: 1    triamterene-hydrochlorothiazide (DYAZIDE) 37.5-25 MG per capsule, Take 1 capsule by mouth Daily As Needed., Disp: , Rfl:     vitamin B-12 (CYANOCOBALAMIN) 100 MCG tablet, Take 0.5 tablets by mouth Daily., Disp: , Rfl:     Zinc Sulfate (ZINC 15 PO), Take  by mouth., Disp: , Rfl:     brompheniramine-pseudoephedrine-DM (Bromfed DM) 30-2-10 MG/5ML syrup, Take 5 mL by mouth 4 (Four) Times a Day As Needed for Cough. (Patient not taking: Reported on 1/22/2025), Disp: 119 mL, Rfl: 0    busPIRone (BUSPAR) 15 MG tablet, , Disp: , Rfl:     cephalexin (KEFLEX) 250 MG capsule, , Disp: , Rfl:     chlorhexidine (Hibiclens) 4 % external liquid, , Disp: , Rfl:     Cholecalciferol (Vitamin D3) 50 MCG (2000 UT) tablet, Take 1 tablet by mouth Daily. (Patient not taking: Reported on 1/22/2025), Disp: , Rfl:     Fluticasone-Salmeterol (ADVAIR/WIXELA) 250-50 MCG/ACT DISKUS, Inhale 1 puff. (Patient not taking: Reported on 1/22/2025), Disp: , Rfl:     gabapentin (NEURONTIN) 100 MG capsule, , Disp: , Rfl:     HYDROcodone-acetaminophen (NORCO)  MG per tablet, Take 1 tablet by mouth Every 6 (Six) Hours As Needed for Moderate Pain . (Patient not taking: Reported on 1/22/2025), Disp: 15 tablet, Rfl: 0    HYDROmorphone (DILAUDID) 2 MG tablet, , Disp: , Rfl:     hydrOXYzine pamoate (VISTARIL) 25 MG capsule, , Disp: , Rfl:     lamoTRIgine ER 50 MG tablet sustained-release 24 hour, Take 1 tablet by mouth Daily. (Patient not taking: Reported on 1/22/2025), Disp: , Rfl:     Lidocaine (ZTlido) 1.8 % patch, , Disp: ,  "Rfl:     Pancrelipase, Lip-Prot-Amyl, (Creon) 55796-333258 units capsule delayed-release particles capsule, , Disp: , Rfl:     polyethylene glycol (MIRALAX) 17 g packet, Take 17 g by mouth Daily. Hold for diarrhea (Patient not taking: Reported on 1/22/2025), Disp: 10 each, Rfl: 0    rimegepant sulfate (NURTEC) 75 MG tablet, Take 1 tablet by mouth Daily As Needed (migraine)., Disp: 4 tablet, Rfl: 0    sucralfate (CARAFATE) 1 g tablet, Take 1 tablet by mouth Every 4 (Four) Hours As Needed. (Patient not taking: Reported on 1/22/2025), Disp: , Rfl:   **I did not stop or change the above medications.  Patient's medication list was updated to reflect medications they have reported as currently taking, including medication changes made by other providers.    Objective   Vital Signs:  /72   Pulse 75   Resp 20   Ht 167.6 cm (65.98\")   Wt 56.2 kg (124 lb)   SpO2 94%   BMI 20.02 kg/m²   Body mass index is 20.02 kg/m².    Physical Exam   Physical Exam:  GENERAL: NAD, alert  HEENT: Normocephalic, atraumatic   Resp: Even and unlabored    Neurological:   MS: AOx3, recent/remote memory intact, normal attention/concentration, language intact, no neglect  CN: visual acuity grossly normal, PERRL, EOMI, no nystagmus, no facial droop, no dysarthria  Motor: Moves all four extremities symmetrically and against gravity. Normal tone and bulk. No tremor or abnormal movements noted.   Sensory: Intact to crude touch in all four ext.  Gait and station: Normal gait and station    Result Review :  The following data was reviewed by: FELIPE Hudson on 01/22/2025:  Laboratory Results:         Lab Results   Component Value Date    WBC 5.99 07/17/2024    HGB 13.2 07/17/2024    HCT 40.5 07/17/2024    MCV 89.8 07/17/2024     07/17/2024     Lab Results   Component Value Date    GLUCOSE 101 (H) 10/28/2022    BUN 5 (L) 02/02/2023    CREATININE 0.69 02/02/2023    EGFRIFNONA 71 06/24/2021    EGFRIFAFRI >60 02/02/2023    BCR 7.1 " "02/02/2023    K 4.5 02/02/2023    CO2 25 02/02/2023    CALCIUM 9.8 02/02/2023    ALBUMIN 4.4 02/02/2023    LABIL2 1.3 02/02/2023    AST 27 02/02/2023    ALT 10 02/02/2023     Lab Results   Component Value Date    HGBA1C 5.6 07/17/2024     No results found for: \"CHOL\"  Lab Results   Component Value Date    HDL 64 07/06/2020    HDL 75 08/10/2018    HDL 63 04/11/2018     Lab Results   Component Value Date     (H) 07/06/2020     (H) 08/10/2018     (H) 04/11/2018     Lab Results   Component Value Date    TRIG 243 (H) 07/06/2020    TRIG 108 08/10/2018    TRIG 135 04/11/2018     No results found for: \"RPR\"  Lab Results   Component Value Date    TSH 1.600 01/29/2021     Lab Results   Component Value Date    [id number] >2,000 07/17/2024                  Assessment and Plan   Diagnoses and all orders for this visit:    1. Migraine without aura and without status migrainosus, not intractable (Primary)  -     rizatriptan MLT (MAXALT-MLT) 10 MG disintegrating tablet; Place 1 tablet on the tongue 1 (One) Time As Needed for Migraine.  Dispense: 12 tablet; Refill: 5  -     topiramate (TOPAMAX) 200 MG tablet; Take 1 tablet by mouth Daily.  Dispense: 90 tablet; Refill: 1  -     rimegepant sulfate (NURTEC) 75 MG tablet; Take 1 tablet by mouth Daily As Needed (for migraine headache). Do not exceed more than one tablet in a 24 hour period.  Dispense: 16 tablet; Refill: 5  -     rimegepant sulfate (NURTEC) 75 MG tablet; Take 1 tablet by mouth Daily As Needed (migraine).  Dispense: 4 tablet; Refill: 0    2. Benign paroxysmal positional vertigo, unspecified laterality      Continue topiramate 200 mg nightly for migraine prevention and rizatriptan for migraine rescue.  We will resubmit Nurtec today.  Also provided samples.    We will see Fartun back in 6 months with Dr. Uriostegui, sooner if symptoms warrant.     FELIPE Hudson  [facility]   01/22/25       I spent 30 minutes caring for Fartun on this date of " service. This time includes time spent by me in the following activities:preparing for the visit, obtaining and/or reviewing a separately obtained history, performing a medically appropriate examination and/or evaluation , counseling and educating the patient/family/caregiver, ordering medications, tests, or procedures, referring and communicating with other health care professionals , documenting information in the medical record, and care coordination  Follow Up   No follow-ups on file.  Patient was given instructions and counseling regarding her condition or for health maintenance advice. Please see specific information pulled into the AVS if appropriate.       Dictated using Dragon Dictation

## 2025-02-17 ENCOUNTER — TELEPHONE (OUTPATIENT)
Dept: NEUROLOGY | Facility: CLINIC | Age: 65
End: 2025-02-17
Payer: MEDICARE

## 2025-02-17 NOTE — TELEPHONE ENCOUNTER
Provider: AIXA NOBLE APRN    Caller: Fartun Grier    Relationship to Patient: Self    Phone Number: [phone]    Reason for Call: STATED SHE HAS HAD ANOTHER SPELL.  STATED SHE IS NOT SURE IF IT WAS A SEIZURE OR AURA.  STATED SHE WAS UNABLE TO SPEAK OR RESPOND TO HER .   STATED IT LAST FOR OVER 5 MINUTES.  STATED SHE WAS JUST STARING UP AT THE CEILING.   STATED THIS HAPPENED YESTERDAY (2-16-25).  STATED AFTER THAT HER HEAD STARTED HURTING ON THE LEFT SIDE.  STATED THEN SHE WAS ABLE TO START SPEAKING.    When was the patient last seen: 1-22-25    When did it start: 2-16-25    PLEASE CALL & ADVISE

## 2025-02-17 NOTE — TELEPHONE ENCOUNTER
Called patient and got her scheduled for a sooner appt. Instructed her to report to the ED if she has another episode and it lasts 5 mins or longer. She states she did end up going to the ED after last nights episode due to having chest pain but the tests were all normal. She just has bad head pain today. She was also instructed not to drive for 90 days from yesterday's episode, which patient was informed that date is May 17, 2025. She verbalized understanding and agreed it was best as she doesn't feel comfortable driving.